# Patient Record
Sex: FEMALE | Race: WHITE | Employment: FULL TIME | URBAN - METROPOLITAN AREA
[De-identification: names, ages, dates, MRNs, and addresses within clinical notes are randomized per-mention and may not be internally consistent; named-entity substitution may affect disease eponyms.]

---

## 2017-07-10 ENCOUNTER — HOSPITAL ENCOUNTER (EMERGENCY)
Facility: CLINIC | Age: 37
Discharge: HOME | End: 2017-07-11
Attending: EMERGENCY MEDICINE | Admitting: EMERGENCY MEDICINE
Payer: COMMERCIAL

## 2017-07-10 ENCOUNTER — APPOINTMENT (OUTPATIENT)
Dept: CT IMAGING | Facility: CLINIC | Age: 37
End: 2017-07-10
Attending: EMERGENCY MEDICINE
Payer: COMMERCIAL

## 2017-07-10 ENCOUNTER — OFFICE VISIT (OUTPATIENT)
Dept: URGENT CARE | Facility: URGENT CARE | Age: 37
End: 2017-07-10
Payer: COMMERCIAL

## 2017-07-10 VITALS
WEIGHT: 154 LBS | TEMPERATURE: 97.9 F | BODY MASS INDEX: 26.03 KG/M2 | DIASTOLIC BLOOD PRESSURE: 80 MMHG | SYSTOLIC BLOOD PRESSURE: 120 MMHG | OXYGEN SATURATION: 98 % | RESPIRATION RATE: 16 BRPM
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
## 2017-07-10 DIAGNOSIS — G44.311 INTRACTABLE ACUTE POST-TRAUMATIC HEADACHE: Primary | ICD-10-CM

## 2017-07-10 DIAGNOSIS — G43.001 MIGRAINE WITHOUT AURA AND WITH STATUS MIGRAINOSUS, NOT INTRACTABLE: ICD-10-CM

## 2017-07-10 PROCEDURE — 25000128 H RX IP 250 OP 636: Performed by: EMERGENCY MEDICINE

## 2017-07-10 PROCEDURE — 96374 THER/PROPH/DIAG INJ IV PUSH: CPT

## 2017-07-10 PROCEDURE — 70450 CT HEAD/BRAIN W/O DYE: CPT

## 2017-07-10 PROCEDURE — 96361 HYDRATE IV INFUSION ADD-ON: CPT

## 2017-07-10 PROCEDURE — 99207 ZZC NO BILLABLE SERVICE THIS VISIT: CPT | Performed by: HOSPITALIST

## 2017-07-10 PROCEDURE — 96375 TX/PRO/DX INJ NEW DRUG ADDON: CPT

## 2017-07-10 PROCEDURE — 99285 EMERGENCY DEPT VISIT HI MDM: CPT | Mod: 25

## 2017-07-10 RX ORDER — SODIUM CHLORIDE 9 MG/ML
1000 INJECTION, SOLUTION INTRAVENOUS CONTINUOUS
Status: DISCONTINUED | OUTPATIENT
Start: 2017-07-10 | End: 2017-07-11 | Stop reason: HOSPADM

## 2017-07-10 RX ORDER — DIPHENHYDRAMINE HYDROCHLORIDE 50 MG/ML
25 INJECTION INTRAMUSCULAR; INTRAVENOUS ONCE
Status: COMPLETED | OUTPATIENT
Start: 2017-07-10 | End: 2017-07-10

## 2017-07-10 RX ORDER — METOCLOPRAMIDE HYDROCHLORIDE 5 MG/ML
10 INJECTION INTRAMUSCULAR; INTRAVENOUS ONCE
Status: COMPLETED | OUTPATIENT
Start: 2017-07-10 | End: 2017-07-10

## 2017-07-10 RX ORDER — DEXAMETHASONE SODIUM PHOSPHATE 10 MG/ML
10 INJECTION, SOLUTION INTRAMUSCULAR; INTRAVENOUS ONCE
Status: COMPLETED | OUTPATIENT
Start: 2017-07-10 | End: 2017-07-10

## 2017-07-10 RX ADMIN — SODIUM CHLORIDE 1000 ML: 9 INJECTION, SOLUTION INTRAVENOUS at 22:55

## 2017-07-10 RX ADMIN — PROCHLORPERAZINE EDISYLATE 10 MG: 5 INJECTION INTRAMUSCULAR; INTRAVENOUS at 22:57

## 2017-07-10 RX ADMIN — METOCLOPRAMIDE 10 MG: 5 INJECTION, SOLUTION INTRAMUSCULAR; INTRAVENOUS at 23:17

## 2017-07-10 RX ADMIN — DEXAMETHASONE SODIUM PHOSPHATE 10 MG: 10 INJECTION, SOLUTION INTRAMUSCULAR; INTRAVENOUS at 22:55

## 2017-07-10 RX ADMIN — DIPHENHYDRAMINE HYDROCHLORIDE 25 MG: 50 INJECTION, SOLUTION INTRAMUSCULAR; INTRAVENOUS at 22:55

## 2017-07-10 NOTE — ED AVS SNAPSHOT
[facility]    [address]    [facility] [address]    Phone:  [phone]    Fax:  [phone]                                       Bhavna Rich   MRN: [id number]    Department:  [facility]   Date of Visit:  7/10/2017           After Visit Summary Signature Page     I have received my discharge instructions, and my questions have been answered. I have discussed any challenges I see with this plan with the nurse or doctor.    ..........................................................................................................................................  Patient/Patient Representative Signature      ..........................................................................................................................................  Patient Representative Print Name and Relationship to Patient    ..................................................               ................................................  Date                                            Time    ..........................................................................................................................................  Reviewed by Signature/Title    ...................................................              ..............................................  Date                                                            Time
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
## 2017-07-10 NOTE — MR AVS SNAPSHOT
After Visit Summary   7/10/2017    Bhavna Rich    MRN: [id number]           Patient Information     Date Of Birth          1980        Visit Information        Provider Department      7/10/2017 8:55 PM Herman Covarrubias MD [facility]        Today's Diagnoses     Intractable acute post-traumatic headache    -  1       Follow-ups after your visit        Who to contact     If you have questions or need follow up information about today's clinic visit or your schedule please contact [facility] directly at [phone].  Normal or non-critical lab and imaging results will be communicated to you by [employer], letter or phone within 4 business days after the clinic has received the results. If you do not hear from us within 7 days, please contact the clinic through [employer] or phone. If you have a critical or abnormal lab result, we will notify you by phone as soon as possible.  Submit refill requests through [employer] or call your pharmacy and they will forward the refill request to us. Please allow 3 business days for your refill to be completed.          Additional Information About Your Visit        MyChart Information     [employer] gives you secure access to your electronic health record. If you see a primary care provider, you can also send messages to your care team and make appointments. If you have questions, please call your primary care clinic.  If you do not have a primary care provider, please call [phone] and they will assist you.        Care EveryWhere ID     This is your Care EveryWhere ID. This could be used by other organizations to access your [facility] medical records  [id number]        Your Vitals Were     Temperature Respirations Last Period Pulse Oximetry BMI (Body Mass Index)       97.9  F (36.6  C) (Oral) 16 08/01/2012 98% 26.03 kg/m2        Blood Pressure from Last 3 Encounters:   07/10/17 120/80   09/30/16 114/65   05/18/15  120/70    Weight from Last 3 Encounters:   07/10/17 154 lb (69.9 kg)   05/18/15 174 lb (78.9 kg)   03/22/13 151 lb 9 oz (68.7 kg)              Today, you had the following     No orders found for display       Primary Care Provider Office Phone # Fax #    [facility] [phone]       [address] [facility] [address]        Equal Access to Services     CHANO MACKEY : Hadii aad ku hadasho Soomaali, waaxda luqadaha, qaybta kaalmada adeegyada, waxay idiin hayaan adeeg cornell díaz . So [facility] [phone].    ATENCIÓN: Si habla español, tiene a mccartney disposición servicios gratuitos de asistencia lingüística. Llame al [phone].    We comply with applicable federal civil rights laws and [place] laws. We do not discriminate on the basis of race, color, national origin, age, disability sex, sexual orientation or gender identity.            Thank you!     Thank you for choosing [facility]  for your care. Our goal is always to provide you with excellent care. Hearing back from our patients is one way we can continue to improve our services. Please take a few minutes to complete the written survey that you may receive in the mail after your visit with us. Thank you!             Your Updated Medication List - Protect others around you: Learn how to safely use, store and throw away your medicines at www.disposemymeds.org.          This list is accurate as of: 7/10/17  9:05 PM.  Always use your most recent med list.                   Brand Name Dispense Instructions for use Diagnosis    cetirizine 10 MG tablet    zyrTEC     Take 10 mg by mouth daily        citalopram 40 MG tablet    celeXA    90 tablet    Take 1 tablet by mouth daily.    Anxiety state, unspecified, Major depressive disorder       NONFORMULARY      1 tablet daily Brand name = [employer] Works        TRAZODONE HCL PO      Take 100 mg by mouth At Bedtime        varenicline 0.5 MG X 11 & 1 MG X 42 tablet    CHANTIX IVANNA      Take one 0.5mg tab daily for 3 days, then one 0.5mg tab twice daily for 4 days, then one 1mg tab twice daily.        VENTOLIN  (90 BASE) MCG/ACT Inhaler   Generic drug:  albuterol      1-2 puffs every 6 hours as needed

## 2017-07-11 VITALS
SYSTOLIC BLOOD PRESSURE: 115 MMHG | DIASTOLIC BLOOD PRESSURE: 73 MMHG | RESPIRATION RATE: 18 BRPM | BODY MASS INDEX: 26.29 KG/M2 | OXYGEN SATURATION: 95 % | HEART RATE: 75 BPM | TEMPERATURE: 98.1 F | WEIGHT: 154 LBS | HEIGHT: 64 IN

## 2017-07-11 PROCEDURE — 96361 HYDRATE IV INFUSION ADD-ON: CPT

## 2017-07-11 ASSESSMENT — ENCOUNTER SYMPTOMS
HEADACHES: 1
NECK STIFFNESS: 1
BACK PAIN: 0
NECK PAIN: 0
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
## 2018-06-16 ENCOUNTER — HEALTH MAINTENANCE LETTER (OUTPATIENT)
Age: 38
End: 2018-06-16

## 2019-10-03 ENCOUNTER — HEALTH MAINTENANCE LETTER (OUTPATIENT)
Age: 39
End: 2019-10-03

## 2020-03-22 ENCOUNTER — HEALTH MAINTENANCE LETTER (OUTPATIENT)
Age: 40
End: 2020-03-22

## 2021-01-15 ENCOUNTER — HEALTH MAINTENANCE LETTER (OUTPATIENT)
Age: 41
End: 2021-01-15

## 2021-04-03 ENCOUNTER — HOSPITAL ENCOUNTER (EMERGENCY)
Facility: CLINIC | Age: 41
Discharge: HOME | End: 2021-04-03
Attending: EMERGENCY MEDICINE | Admitting: EMERGENCY MEDICINE
Payer: COMMERCIAL

## 2021-04-03 VITALS
TEMPERATURE: 97 F | DIASTOLIC BLOOD PRESSURE: 90 MMHG | RESPIRATION RATE: 18 BRPM | OXYGEN SATURATION: 95 % | SYSTOLIC BLOOD PRESSURE: 131 MMHG | HEART RATE: 96 BPM

## 2021-04-03 DIAGNOSIS — R51.9 NONINTRACTABLE HEADACHE, UNSPECIFIED CHRONICITY PATTERN, UNSPECIFIED HEADACHE TYPE: ICD-10-CM

## 2021-04-03 PROCEDURE — 99284 EMERGENCY DEPT VISIT MOD MDM: CPT | Mod: 25

## 2021-04-03 PROCEDURE — 250N000011 HC RX IP 250 OP 636: Performed by: EMERGENCY MEDICINE

## 2021-04-03 PROCEDURE — 96374 THER/PROPH/DIAG INJ IV PUSH: CPT

## 2021-04-03 PROCEDURE — 96361 HYDRATE IV INFUSION ADD-ON: CPT

## 2021-04-03 PROCEDURE — 258N000003 HC RX IP 258 OP 636: Performed by: EMERGENCY MEDICINE

## 2021-04-03 PROCEDURE — 96375 TX/PRO/DX INJ NEW DRUG ADDON: CPT

## 2021-04-03 RX ORDER — KETOROLAC TROMETHAMINE 15 MG/ML
15 INJECTION, SOLUTION INTRAMUSCULAR; INTRAVENOUS ONCE
Status: COMPLETED | OUTPATIENT
Start: 2021-04-03 | End: 2021-04-03

## 2021-04-03 RX ORDER — DIPHENHYDRAMINE HYDROCHLORIDE 50 MG/ML
25 INJECTION INTRAMUSCULAR; INTRAVENOUS ONCE
Status: COMPLETED | OUTPATIENT
Start: 2021-04-03 | End: 2021-04-03

## 2021-04-03 RX ORDER — SODIUM CHLORIDE 9 MG/ML
INJECTION, SOLUTION INTRAVENOUS CONTINUOUS
Status: DISCONTINUED | OUTPATIENT
Start: 2021-04-03 | End: 2021-04-04 | Stop reason: HOSPADM

## 2021-04-03 RX ORDER — METOCLOPRAMIDE HYDROCHLORIDE 5 MG/ML
10 INJECTION INTRAMUSCULAR; INTRAVENOUS ONCE
Status: COMPLETED | OUTPATIENT
Start: 2021-04-03 | End: 2021-04-03

## 2021-04-03 RX ORDER — DEXAMETHASONE SODIUM PHOSPHATE 4 MG/ML
10 INJECTION, SOLUTION INTRA-ARTICULAR; INTRALESIONAL; INTRAMUSCULAR; INTRAVENOUS; SOFT TISSUE ONCE
Status: COMPLETED | OUTPATIENT
Start: 2021-04-03 | End: 2021-04-03

## 2021-04-03 RX ADMIN — KETOROLAC TROMETHAMINE 15 MG: 15 INJECTION, SOLUTION INTRAMUSCULAR; INTRAVENOUS at 21:06

## 2021-04-03 RX ADMIN — SODIUM CHLORIDE 1000 ML: 9 INJECTION, SOLUTION INTRAVENOUS at 21:08

## 2021-04-03 RX ADMIN — DEXAMETHASONE SODIUM PHOSPHATE 10 MG: 4 INJECTION, SOLUTION INTRA-ARTICULAR; INTRALESIONAL; INTRAMUSCULAR; INTRAVENOUS; SOFT TISSUE at 21:03

## 2021-04-03 RX ADMIN — METOCLOPRAMIDE HYDROCHLORIDE 10 MG: 5 INJECTION INTRAMUSCULAR; INTRAVENOUS at 21:02

## 2021-04-03 RX ADMIN — DIPHENHYDRAMINE HYDROCHLORIDE 25 MG: 50 INJECTION INTRAMUSCULAR; INTRAVENOUS at 21:07

## 2021-04-03 ASSESSMENT — ENCOUNTER SYMPTOMS
VOMITING: 0
HEADACHES: 1
FEVER: 0
COUGH: 0

## 2021-04-04 NOTE — ED PROVIDER NOTES
History   Chief Complaint:  Headache       HPI   Bhavna Rcih is a 40 year old female with history of migraines who presents with headache. The patient states that at 1430 she developed a headache. The patient states that it started in the back of her head and has now radiated to her whole head. She denies any fever, cough, vomiting or vision changes or numbness/weakness in the extremities.  Headache has progressed since onset and was not maximal at onset.  The patient states that she took Advil, Compazine and Sumatriptan x2 without much relief. The patient states that she has a history migraines and is currently being followed by a neurologist who has performed a couple of blocks.       MR brain 7/18/2017:  1.  Normal brain MRI.  2.  No acute intracranial finding. No evidence for recent infarct, hemorrhage or mass.    Review of Systems   Constitutional: Negative for fever.   Eyes: Negative for visual disturbance.   Respiratory: Negative for cough.    Gastrointestinal: Negative for vomiting.   Neurological: Positive for headaches.   All other systems reviewed and are negative.      Allergies:  Ancef   Vancomycin  Ciprofloxacin  Daptomycin  Erythromycin  Penicillins  Zoloft    Medications:  Celexa   Trazodone   Ventolin   Chantix   Ativan   Buspar   Atarax   Remeron   Flexeril   Maxalt   Medrol dosepak   Compazine   Sumatriptan     Past Medical History:    Migraines   Depression  generalized anxiety disorder  GERD   sensory neuropathy      Past Surgical History:    partial hysterectomy   Foot surgery   Femur surgery   Appendectomy open   C section   Carpal tunnel release   Tubal ligation     Family History:    Lung cancer- mother   Depression- sister     Social History:  Current everyday smoker     Physical Exam     Patient Vitals for the past 24 hrs:   BP Temp Pulse Resp SpO2   04/03/21 2200 (!) 131/90 -- 96 -- 95 %   04/03/21 2145 131/85 -- 98 -- 95 %   04/03/21 2100 121/88 -- 111 -- 96 %   04/03/21 2009  (!) 154/98 -- -- -- --   04/03/21 2008 -- 97  F (36.1  C) 125 18 97 %       Physical Exam  VS: Reviewed per above  HENT: Mucous membranes moist, no nuchal rigidity  EYES: sclera anicteric  CV: Rate as noted, regular rhythm.   RESP: Effort normal. Breath sounds are normal bilaterally.  GI: no tenderness/rebound/guarding, not distended.  NEURO: GCS 15, cranial nerves II through XII are intact, 5 out of 5 strength in all 4 extremities, sensation is intact light touch in all 4 extremities  MSK: No deformity of the extremities  SKIN: Warm and dry      Emergency Department Course     Procedures    Emergency Department Course:    Reviewed:  I reviewed nursing notes, vitals, past medical history and care everywhere    Assessments:  2055 I obtained history and examined the patient as noted above.     2210 I rechecked the patient and explained findings prior to discharge.     Interventions:  2102 Reglan 10 mg IV     2103 Decadron 10 mg IV     2106 Toradol 15 mg IV     2107 Benadryl 25 mg IV    2108 NS, 1 L, IV    Disposition:  The patient was discharged to home.     Impression & Plan     CMS Diagnoses: None    Medical Decision Making:  Bhavna Rich is a 40 year old female who presents with a headache in setting of history of migraines.  Evaluation in the emergency department has been negative. The patient has not had any fever, weakness, numbness, paresthesias, neck stiffness, seizures, vision changes or confusion. The headache was not worst at onset, worst headache of life, nor thunderclap in nature. Meningitis, pseudotumor, subarachnoid hemorrhage, CNS tumor, venous thrombosis and stroke are considered as part of the differential, and considered unlikely. There has been no trauma to suggest intracranial hemorrhage. I do not believe imaging is indicated at this time.  Her pain has improved with medication interventions.  I encouraged ongoing neurology follow-up.  I recommended she return for worse pain, fever,  vomiting, weakness, or any other concerns.        Covid-19  Bhavna Rich was evaluated during a global COVID-19 pandemic, which necessitated consideration that the patient might be at risk for infection with the SARS-CoV-2 virus that causes COVID-19.   Applicable protocols for evaluation were followed during the patient's care.     Diagnosis:    ICD-10-CM    1. Nonintractable headache, unspecified chronicity pattern, unspecified headache type  R51.9      Scribe Disclosure:  I, Jazmyne Nolasco, am serving as a scribe at 8:37 PM on 4/3/2021 to document services personally performed by Arnoldo Khan MD based on my observations and the provider's statements to me.              Arnoldo Khan MD  04/04/21 0027

## 2021-04-04 NOTE — ED TRIAGE NOTES
Patient reports HA developed this AM. Hx migraines, took sumatriptan and compazine without relief   ABC intact

## 2021-04-05 ENCOUNTER — PRE VISIT (OUTPATIENT)
Dept: NEUROLOGY | Facility: CLINIC | Age: 41
End: 2021-04-05

## 2021-04-05 NOTE — TELEPHONE ENCOUNTER
FUTURE VISIT INFORMATION      FUTURE VISIT INFORMATION:    Date: 4/7/2021    Time: 1030am    [place] [facility] [place]:    Referring provider:  Self     Referring providers clinic:      Reason for visit/diagnosis  Migraines     RECORDS REQUESTED FROM:       Clinic name Comments Records Status Imaging Status   Internal ED Visit-4/3/2021    CT Head-7/10/2017 [facility] PACS         [facility]-3/29/2021    [facility]-2/19/2021    MR Brain-7/18/2017 [facility] Requested to [facility]                        4/6/2021-Spoke with [facility] to have images pushed ASA-MR @ 625am    4/7/2021-[facility] Images now in PACS-MR @ 723am

## 2021-04-07 ENCOUNTER — VIRTUAL VISIT (OUTPATIENT)
Dept: NEUROLOGY | Facility: CLINIC | Age: 41
End: 2021-04-07
Payer: COMMERCIAL

## 2021-04-07 ENCOUNTER — TELEPHONE (OUTPATIENT)
Dept: NEUROLOGY | Facility: CLINIC | Age: 41
End: 2021-04-07

## 2021-04-07 DIAGNOSIS — R51.9 WORSENING HEADACHES: ICD-10-CM

## 2021-04-07 DIAGNOSIS — G43.719 INTRACTABLE CHRONIC MIGRAINE WITHOUT AURA AND WITHOUT STATUS MIGRAINOSUS: Primary | ICD-10-CM

## 2021-04-07 PROCEDURE — 99204 OFFICE O/P NEW MOD 45 MIN: CPT | Mod: GT | Performed by: NURSE PRACTITIONER

## 2021-04-07 RX ORDER — PSEUDOEPHEDRINE HCL 30 MG
30 TABLET ORAL PRN
COMMUNITY
Start: 2020-03-06 | End: 2024-09-17

## 2021-04-07 RX ORDER — MIRTAZAPINE 45 MG/1
45 TABLET, FILM COATED ORAL AT BEDTIME
COMMUNITY
Start: 2021-03-31

## 2021-04-07 RX ORDER — LORAZEPAM 1 MG/1
.5-1 TABLET ORAL PRN
COMMUNITY
Start: 2021-03-04

## 2021-04-07 RX ORDER — PROCHLORPERAZINE MALEATE 10 MG
10 TABLET ORAL PRN
COMMUNITY
Start: 2021-02-19 | End: 2021-04-21

## 2021-04-07 RX ORDER — RIZATRIPTAN BENZOATE 10 MG/1
10 TABLET ORAL PRN
COMMUNITY
Start: 2021-02-19 | End: 2021-04-13

## 2021-04-07 RX ORDER — DULOXETIN HYDROCHLORIDE 60 MG/1
60 CAPSULE, DELAYED RELEASE ORAL DAILY
COMMUNITY
Start: 2021-03-22

## 2021-04-07 RX ORDER — FEXOFENADINE HCL 180 MG/1
180 TABLET ORAL PRN
COMMUNITY
Start: 2020-03-06 | End: 2024-01-03

## 2021-04-07 RX ORDER — DULOXETIN HYDROCHLORIDE 30 MG/1
30 CAPSULE, DELAYED RELEASE ORAL DAILY
COMMUNITY
Start: 2021-03-22

## 2021-04-07 RX ORDER — QUETIAPINE FUMARATE 50 MG/1
75 TABLET, FILM COATED ORAL 2 TIMES DAILY
COMMUNITY
Start: 2021-03-04 | End: 2024-01-03

## 2021-04-07 NOTE — TELEPHONE ENCOUNTER
Health Call Center    Phone Message    May a detailed message be left on voicemail: yes     Reason for Call: Other: Patient calling in regards to appointment with [facility] at 10:30 today 4/7 - patient states that she is unable to get into virtual waiting room due to browser not being supported.  Patient is requesting for link to be sent as a text to phone number [phone].    Please advise and call patient back at your earliest convenience to discuss further     Action Taken: [place] [facility]    Travel Screening: Not Applicable

## 2021-04-07 NOTE — PROGRESS NOTES
Bhavna is a 40 year old who is being evaluated via a billable video visit.      Unable to contact patient    How would you like to obtain your AVS? MyChart  If the video visit is dropped, the invitation should be resent by: Send to e-mail at: [email or web address]  Will anyone else be joining your video visit? No      Video Start Time: 10:59 AM  Video-Visit Details    Type of service:  Video Visit    Video End Time:    Originating Location (pt. Location): Home    Distant Location (provider location):  [facility]OLOG[facility]     Platform used for Video Visit: [facility]      Chief Complaint   Patient presents with     Consult     VIDEO VISIT SISSY Ndiaye    Video End Time: 11:41 AM    ASSESSMENT AND PLAN:   Headache symptoms appear to be migrainous phenotype. Because of worsening headaches it does not appear to be unreasonable to recommend brain MRI for any structural headache symptoms.   Plan discussed:  Brain MRI for any structural causes of worsening headaches  Stress relieving modalities   Sleep routine   Increased water intake  Antimigraine Nerivio devices -FDA cleared antimigraine device  Vitamin B2 (ribofalvin) 200-400 mg daily OTC side effects-GI, urine discoloration  Migraine headache prevention-Recommended a trial of migraine preventive treatment with Emgality. Side effects-allergic reaction or pain in the injection side or redness in the injection side. Unknown side effects with a long term use. Pregnancy is contraindicated.     Acute migraine headache treatment  Stop rizatriptan  sumatriptan -injection and sumatriptan  mg total per 24 hours if used the same day with sumatriptan   May cause serotine syndrome-symptoms reviewed  Compazine +Naproxen 500 mg every 12 hours as needed with food and can be taken with sumatriptan.     Follow up in 2-3 months or sooner if needed headache       Subjective   Bhavna is a 40 year old who presents for the following health issues  -headache   HPI   Headache history-Headaches with pregnancies. Onset of migraine headaches with starting a menstrual cycle. Migraine headaches have increased in the last couple of months. Patient went to the [facility].   Headaches  stem from the back of the head and usually right side and behind right eye. Pulsing and searing pain and starts to get nausea, light and noise sensitivity. Being in the dark and quiet room usually helps.  Triggers-noise, food, not enough sleep,   History of partial hysterectomy   Headache Frequency -10-15 days per month and duration all day      FH: father and paternal uncle -headaches    ED visit on 4/3/2021  Reglan, decadron, toradol, benadryl and IV fluids. Helped.     Headache treatment:  Occipital nerve blocks twice at [facility] but did not work mid-end March  Flexeril -did not help  Duloxetine 90 mg for depression for a couple of years and helps with depression  Gabapentin 100 mg TID per psychiatry and was increased 200 mg TID and caused side effects-stuttering and stopped gabapentin  lorazapam per psychiatry for panic attacks but it does not help  Medrol pack for headaches and did not help and no side effects  Mirtazapine and trazadone as needed for insomnia  Prochlorperazine 10 mg -tried once did help before but not last Saturday  Quetiapine 75 mg am and at bedtime  Rizatriptan tried twice and did not help  Sumatriptan -stopped working    SH:  Dental assistant, 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      Objective       Vitals:  No vitals were obtained today due to virtual visit.    Physical Exam   Headache today is mild but not as severe   GENERAL: Healthy, alert and no distress  EYES: Eyes grossly normal to inspection.  No discharge or erythema, or obvious scleral/conjunctival abnormalities.  RESP: No audible wheeze, cough, or visible cyanosis.   No visible retractions or increased work of breathing.    SKIN: Visible skin clear. No significant rash, abnormal pigmentation or lesions.  NEURO: Cranial nerves grossly intact.  Mentation and speech appropriate for age.  PSYCH: Mentation appears normal, affect normal/bright, judgement and insight intact, normal speech and appearance well-groomed.    DATA:  CT HEAD WITHOUT CONTRAST  7/10/2017 11:35 PM      HISTORY: Headache.     COMPARISON: None.     TECHNIQUE: Without intravenous contrast, helical sections were  acquired through the brain. Coronal reconstructions were generated.  Radiation dose for this scan was reduced using automated exposure  control, adjustment of the mA and/or kV according to the patient's  size, or iterative reconstruction technique.     FINDINGS: No intra-axial mass, mass effect or midline shift. Normal  gray-white matter differentiation. No visualized acute intra-axial  hemorrhage. The cerebral ventricles are normal in caliber. The basal  cisterns are patent. No extra-axial fluid collection. The visualized  portions of the paranasal sinuses and mastoid air cells are  unremarkable.                                                                      IMPRESSION: No evidence of acute intracranial abnormality.     LES DOW MD      I discussed all my recommendations with Bhavna Rich who verbalizes understanding and comfortable with the plan.  All of patient's questions were answered from the best of my knowledge.  Patient is in agreement with the plan.     45 minutes spent on the date of the encounter doing chart review, history and exam, documentation and further activities as noted above    RAVI Mckeon, CNP [facility]  Headache certified  [facility]

## 2021-04-07 NOTE — TELEPHONE ENCOUNTER
Patient phone number was given to the roomers, stated that link will be sent to patient phone number that has been provided by patient

## 2021-04-07 NOTE — LETTER
4/7/2021       RE: Bhavna Rich  [address] [address]     Dear Colleague,    Thank you for referring your patient, Bhavna Rich, to the [facility] [place] at [facility]. Please see a copy of my visit note below.    Bhavna is a 40 year old who is being evaluated via a billable video visit.      Unable to contact patient    How would you like to obtain your AVS? MyChart  If the video visit is dropped, the invitation should be resent by: Send to e-mail at: [email or web address]  Will anyone else be joining your video visit? No      Video Start Time: 10:59 AM  Video-Visit Details    Type of service:  Video Visit    Video End Time:    Originating Location (pt. Location): Home    Distant Location (provider location):  [facility] [place]     Platform used for Video Visit: [facility]      Chief Complaint   Patient presents with     Consult     VIDEO VISIT SISSY Ndiaye    Video End Time: 11:41 AM    ASSESSMENT AND PLAN:   Headache symptoms appear to be migrainous phenotype. Because of worsening headaches it does not appear to be unreasonable to recommend brain MRI for any structural headache symptoms.   Plan discussed:  Brain MRI for any structural causes of worsening headaches  Stress relieving modalities   Sleep routine   Increased water intake  Antimigraine Nerivio devices -FDA cleared antimigraine device  Vitamin B2 (ribofalvin) 200-400 mg daily OTC side effects-GI, urine discoloration  Migraine headache prevention-Recommended a trial of migraine preventive treatment with Emgality. Side effects-allergic reaction or pain in the injection side or redness in the injection side. Unknown side effects with a long term use. Pregnancy is contraindicated.     Acute migraine headache treatment  Stop rizatriptan  sumatriptan -injection and sumatriptan  mg total per 24 hours if used the  same day with sumatriptan   May cause serotine syndrome-symptoms reviewed  Compazine +Naproxen 500 mg every 12 hours as needed with food and can be taken with sumatriptan.     Follow up in 2-3 months or sooner if needed headache       Subjective   Bhavna is a 40 year old who presents for the following health issues -headache   HPI   Headache history-Headaches with pregnancies. Onset of migraine headaches with starting a menstrual cycle. Migraine headaches have increased in the last couple of months. Patient went to the [facility].   Headaches  stem from the back of the head and usually right side and behind right eye. Pulsing and searing pain and starts to get nausea, light and noise sensitivity. Being in the dark and quiet room usually helps.  Triggers-noise, food, not enough sleep,   History of partial hysterectomy   Headache Frequency -10-15 days per month and duration all day      FH: father and paternal uncle -headaches    ED visit on 4/3/2021  Reglan, decadron, toradol, benadryl and IV fluids. Helped.     Headache treatment:  Occipital nerve blocks twice at [facility] but did not work mid-end March  Flexeril -did not help  Duloxetine 90 mg for depression for a couple of years and helps with depression  Gabapentin 100 mg TID per psychiatry and was increased 200 mg TID and caused side effects-stuttering and stopped gabapentin  lorazapam per psychiatry for panic attacks but it does not help  Medrol pack for headaches and did not help and no side effects  Mirtazapine and trazadone as needed for insomnia  Prochlorperazine 10 mg -tried once did help before but not last Saturday  Quetiapine 75 mg am and at bedtime  Rizatriptan tried twice and did not help  Sumatriptan -stopped working    SH:  Dental assistant, 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      Objective       Vitals:  No  vitals were obtained today due to virtual visit.    Physical Exam   Headache today is mild but not as severe   GENERAL: Healthy, alert and no distress  EYES: Eyes grossly normal to inspection.  No discharge or erythema, or obvious scleral/conjunctival abnormalities.  RESP: No audible wheeze, cough, or visible cyanosis.  No visible retractions or increased work of breathing.    SKIN: Visible skin clear. No significant rash, abnormal pigmentation or lesions.  NEURO: Cranial nerves grossly intact.  Mentation and speech appropriate for age.  PSYCH: Mentation appears normal, affect normal/bright, judgement and insight intact, normal speech and appearance well-groomed.    DATA:  CT HEAD WITHOUT CONTRAST  7/10/2017 11:35 PM      HISTORY: Headache.     COMPARISON: None.     TECHNIQUE: Without intravenous contrast, helical sections were  acquired through the brain. Coronal reconstructions were generated.  Radiation dose for this scan was reduced using automated exposure  control, adjustment of the mA and/or kV according to the patient's  size, or iterative reconstruction technique.     FINDINGS: No intra-axial mass, mass effect or midline shift. Normal  gray-white matter differentiation. No visualized acute intra-axial  hemorrhage. The cerebral ventricles are normal in caliber. The basal  cisterns are patent. No extra-axial fluid collection. The visualized  portions of the paranasal sinuses and mastoid air cells are  unremarkable.                                                                      IMPRESSION: No evidence of acute intracranial abnormality.     LES DOW MD      I discussed all my recommendations with Bhavna Rich who verbalizes understanding and comfortable with the plan.  All of patient's questions were answered from the best of my knowledge.  Patient is in agreement with the plan.     45 minutes spent on the date of the encounter doing chart review, history and exam, documentation and further  activities as noted above    RAVI Mckeon, CNP [facility]  Headache certified  [facility]

## 2021-04-08 ENCOUNTER — TELEPHONE (OUTPATIENT)
Dept: NEUROLOGY | Facility: CLINIC | Age: 41
End: 2021-04-08

## 2021-04-08 DIAGNOSIS — F41.9 ANXIETY: Primary | ICD-10-CM

## 2021-04-08 RX ORDER — DIAZEPAM 5 MG
TABLET ORAL
Qty: 1 TABLET | Refills: 0 | Status: SHIPPED | OUTPATIENT
Start: 2021-04-08 | End: 2021-04-21

## 2021-04-08 NOTE — TELEPHONE ENCOUNTER
PA Initiation    Medication: galcanezumab-gnlm (EMGALITY) 120 MG/ML injection; Inject 240 mg subcutaneous first month and then inject 120 mg subcutaneous every 28 days  Insurance Company: [employer] - Phone [phone] Fax [phone]  Pharmacy Filling the Rx: [id number] - [place], [place] - [address]  Filling Pharmacy Phone: [phone]  Filling Pharmacy Fax:    Start Date: 4/8/2021    Central Prior Authorization Team   Phone: [phone]

## 2021-04-09 NOTE — TELEPHONE ENCOUNTER
Prior Authorization Approval    Authorization Effective Date: 3/9/2021  Authorization Expiration Date: 10/9/2021  Medication: galcanezumab-gnlm (EMGALITY) 120 MG/ML injection; Inject 240 mg subcutaneous first month and then inject 120 mg subcutaneous every 28 days  Approved Dose/Quantity: 120mg/ml  Reference #: [id number]   Insurance Company: [employer] - Phone [phone] Fax [phone]  Which Pharmacy is filling the prescription (Not needed for infusion/clinic administered): [id number] - [place], [place] - [address]  [address]   Pharmacy Notified: Yes **Instructed pharmacy to notify patient when script is ready to /ship.**  Patient Notified: Yes

## 2021-04-12 ENCOUNTER — MYC MEDICAL ADVICE (OUTPATIENT)
Dept: NEUROLOGY | Facility: CLINIC | Age: 41
End: 2021-04-12

## 2021-04-12 DIAGNOSIS — G43.719 INTRACTABLE CHRONIC MIGRAINE WITHOUT AURA AND WITHOUT STATUS MIGRAINOSUS: ICD-10-CM

## 2021-04-12 NOTE — TELEPHONE ENCOUNTER
Rx Authorization:    Requested Medication/ DoseSUMAtriptan (IMITREX STATDOSE) 6 MG/0.5ML refill cartridge    Date last refill ordered: 4/8/21    Quantity ordered: 3 ml    # refills: 6    Date of last clinic visit with ordering provider: 4/7/21    Date of next clinic visit with ordering provider:     All pertinent protocol data (lab date/result):     Include pertinent information from patients message:

## 2021-04-13 DIAGNOSIS — G43.719 INTRACTABLE CHRONIC MIGRAINE WITHOUT AURA AND WITHOUT STATUS MIGRAINOSUS: ICD-10-CM

## 2021-04-13 DIAGNOSIS — G43.719 INTRACTABLE CHRONIC MIGRAINE WITHOUT AURA AND WITHOUT STATUS MIGRAINOSUS: Primary | ICD-10-CM

## 2021-04-13 RX ORDER — SUMATRIPTAN 100 MG/1
100 TABLET, FILM COATED ORAL
Qty: 12 TABLET | Refills: 9 | Status: SHIPPED | OUTPATIENT
Start: 2021-04-13 | End: 2022-03-30

## 2021-04-14 ENCOUNTER — HOSPITAL ENCOUNTER (OUTPATIENT)
Dept: MRI IMAGING | Facility: CLINIC | Age: 41
Discharge: HOME | End: 2021-04-14
Attending: NURSE PRACTITIONER | Admitting: NURSE PRACTITIONER
Payer: COMMERCIAL

## 2021-04-14 DIAGNOSIS — G43.719 INTRACTABLE CHRONIC MIGRAINE WITHOUT AURA AND WITHOUT STATUS MIGRAINOSUS: ICD-10-CM

## 2021-04-14 PROCEDURE — A9585 GADOBUTROL INJECTION: HCPCS | Performed by: NURSE PRACTITIONER

## 2021-04-14 PROCEDURE — 70553 MRI BRAIN STEM W/O & W/DYE: CPT | Mod: 26 | Performed by: RADIOLOGY

## 2021-04-14 PROCEDURE — 255N000002 HC RX 255 OP 636: Performed by: NURSE PRACTITIONER

## 2021-04-14 PROCEDURE — 70553 MRI BRAIN STEM W/O & W/DYE: CPT

## 2021-04-14 RX ORDER — GADOBUTROL 604.72 MG/ML
7.5 INJECTION INTRAVENOUS ONCE
Status: COMPLETED | OUTPATIENT
Start: 2021-04-14 | End: 2021-04-14

## 2021-04-14 RX ADMIN — GADOBUTROL 6 ML: 604.72 INJECTION INTRAVENOUS at 11:16

## 2021-04-20 NOTE — RESULT ENCOUNTER NOTE
Gerardo Huggins,  Your brain MRI results and images reviewed and no acute findings at this time. Follow up as discussed.   Take care,  So

## 2021-04-21 ENCOUNTER — VIRTUAL VISIT (OUTPATIENT)
Dept: NEUROLOGY | Facility: CLINIC | Age: 41
End: 2021-04-21
Payer: COMMERCIAL

## 2021-04-21 DIAGNOSIS — G43.719 INTRACTABLE CHRONIC MIGRAINE WITHOUT AURA AND WITHOUT STATUS MIGRAINOSUS: Primary | ICD-10-CM

## 2021-04-21 PROCEDURE — 99214 OFFICE O/P EST MOD 30 MIN: CPT | Mod: GT | Performed by: NURSE PRACTITIONER

## 2021-04-21 RX ORDER — CYCLOBENZAPRINE HCL 10 MG
TABLET ORAL
COMMUNITY
Start: 2021-04-18 | End: 2021-04-21

## 2021-04-21 RX ORDER — PROPRANOLOL HYDROCHLORIDE 10 MG/1
10 TABLET ORAL 2 TIMES DAILY PRN
Qty: 60 TABLET | Refills: 1 | Status: SHIPPED | OUTPATIENT
Start: 2021-04-21 | End: 2021-05-14

## 2021-04-21 RX ORDER — PROCHLORPERAZINE MALEATE 10 MG
10 TABLET ORAL EVERY 6 HOURS PRN
Qty: 20 TABLET | Refills: 5 | Status: SHIPPED | OUTPATIENT
Start: 2021-04-21 | End: 2021-07-28
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
## 2021-04-28 DIAGNOSIS — G43.719 INTRACTABLE CHRONIC MIGRAINE WITHOUT AURA AND WITHOUT STATUS MIGRAINOSUS: ICD-10-CM

## 2021-04-28 NOTE — TELEPHONE ENCOUNTER
Rx Authorization:    Requested Medication/ Dose:Emgality Pen 120MG/Ml    Date last refill ordered: 4/13/21    Quantity ordered: 2ML    # refills: 11    Date of last clinic visit with ordering provider: 4/21/21    Date of next clinic visit with ordering provider: F/U 1 year    All pertinent protocol data (lab date/result):     Include pertinent information from patients message:

## 2021-04-29 DIAGNOSIS — M62.838 MUSCLE SPASM: ICD-10-CM

## 2021-04-29 DIAGNOSIS — G43.719 INTRACTABLE CHRONIC MIGRAINE WITHOUT AURA AND WITHOUT STATUS MIGRAINOSUS: Primary | ICD-10-CM

## 2021-04-29 RX ORDER — CYCLOBENZAPRINE HCL 5 MG
5-10 TABLET ORAL
Qty: 30 TABLET | Refills: 1 | Status: SHIPPED | OUTPATIENT
Start: 2021-04-29 | End: 2021-05-06

## 2021-05-03 NOTE — TELEPHONE ENCOUNTER
Called and spoke with patient and patient stated that she does have enough refill for her Emgality Pen

## 2021-05-04 RX ORDER — GALCANEZUMAB 120 MG/ML
120 INJECTION, SOLUTION SUBCUTANEOUS
Qty: 1 ML | Refills: 11 | Status: SHIPPED | OUTPATIENT
Start: 2021-05-04 | End: 2022-01-26

## 2021-05-06 ENCOUNTER — TELEPHONE (OUTPATIENT)
Dept: NEUROLOGY | Facility: CLINIC | Age: 41
End: 2021-05-06

## 2021-05-09 DIAGNOSIS — G43.719 INTRACTABLE CHRONIC MIGRAINE WITHOUT AURA AND WITHOUT STATUS MIGRAINOSUS: Primary | ICD-10-CM

## 2021-05-09 DIAGNOSIS — M62.838 MUSCLE SPASM: ICD-10-CM

## 2021-05-09 RX ORDER — TIZANIDINE 2 MG/1
2-4 TABLET ORAL AT BEDTIME
Qty: 60 TABLET | Refills: 3 | Status: SHIPPED | OUTPATIENT
Start: 2021-05-09 | End: 2021-07-28

## 2021-05-11 ENCOUNTER — TELEPHONE (OUTPATIENT)
Dept: NEUROLOGY | Facility: CLINIC | Age: 41
End: 2021-05-11

## 2021-05-11 DIAGNOSIS — G43.719 INTRACTABLE CHRONIC MIGRAINE WITHOUT AURA AND WITHOUT STATUS MIGRAINOSUS: ICD-10-CM

## 2021-05-11 NOTE — TELEPHONE ENCOUNTER
Prior Authorization Retail Medication Request    Medication/Dose: emgality 120 mg every 28 days  ICD code (if different than what is on RX):   Occipital nerve blocks twice at [facility] but did not work mid-end March  Flexeril -did not help  Duloxetine 90 mg for depression for a couple of years and helps with depression  Gabapentin 100 mg TID per psychiatry and was increased 200 mg TID and caused side effects-stuttering and stopped gabapentin  lorazapam per psychiatry for panic attacks but it does not help  Medrol pack for headaches and did not help and no side effects  Mirtazapine and trazadone as needed for insomnia  Prochlorperazine 10 mg -tried once did help before but not last Saturday  Quetiapine 75 mg am and at bedtime  Rizatriptan tried twice and did not help  Sumatriptan -stopped working  Rationale:  Chronic migraine     Insurance Name:  [facility]  Insurance ID:  [id number]      Pharmacy Information (if different than what is on RX)  Name:    Phone:

## 2021-05-12 RX ORDER — GALCANEZUMAB 120 MG/ML
INJECTION, SOLUTION SUBCUTANEOUS
OUTPATIENT
Start: 2021-05-12

## 2021-05-13 DIAGNOSIS — G43.719 INTRACTABLE CHRONIC MIGRAINE WITHOUT AURA AND WITHOUT STATUS MIGRAINOSUS: ICD-10-CM

## 2021-05-13 NOTE — TELEPHONE ENCOUNTER
Rx Authorization:    Requested Medication/ Dose propranolol (INDERAL) 10 MG tablet    Date last refill ordered: 4/21/21    Quantity ordered: 60    # refills: 1    Date of last clinic visit with ordering provider: 4/21/21    Date of next clinic visit with ordering provider:     All pertinent protocol data (lab date/result):     Include pertinent information from patients message:   \

## 2021-05-14 RX ORDER — PROPRANOLOL HYDROCHLORIDE 10 MG/1
10 TABLET ORAL 2 TIMES DAILY PRN
Qty: 60 TABLET | Refills: 1 | Status: SHIPPED | OUTPATIENT
Start: 2021-05-14 | End: 2021-06-22

## 2021-06-18 DIAGNOSIS — G43.719 INTRACTABLE CHRONIC MIGRAINE WITHOUT AURA AND WITHOUT STATUS MIGRAINOSUS: ICD-10-CM

## 2021-06-18 NOTE — TELEPHONE ENCOUNTER
Rx Authorization:    Requested Medication/ Dosepropranolol (INDERAL) 10 MG tablet    Date last refill ordered: 5/14/21    Quantity ordered: 60 tablets    # refills: 1    Date of last clinic visit with ordering provider: 4/21/21    Date of next clinic visit with ordering provider:     All pertinent protocol data (lab date/result):     Include pertinent information from patients message:

## 2021-06-22 RX ORDER — PROPRANOLOL HYDROCHLORIDE 10 MG/1
10 TABLET ORAL 2 TIMES DAILY PRN
Qty: 60 TABLET | Refills: 5 | Status: SHIPPED | OUTPATIENT
Start: 2021-06-22 | End: 2021-07-28

## 2021-07-28 ENCOUNTER — VIRTUAL VISIT (OUTPATIENT)
Dept: NEUROLOGY | Facility: CLINIC | Age: 41
End: 2021-07-28
Payer: COMMERCIAL

## 2021-07-28 DIAGNOSIS — G43.719 INTRACTABLE CHRONIC MIGRAINE WITHOUT AURA AND WITHOUT STATUS MIGRAINOSUS: ICD-10-CM

## 2021-07-28 DIAGNOSIS — M62.838 MUSCLE SPASM: ICD-10-CM

## 2021-07-28 PROCEDURE — 99212 OFFICE O/P EST SF 10 MIN: CPT | Mod: GT | Performed by: NURSE PRACTITIONER

## 2021-07-28 RX ORDER — PROCHLORPERAZINE MALEATE 10 MG
10 TABLET ORAL EVERY 6 HOURS PRN
Qty: 20 TABLET | Refills: 5 | Status: SHIPPED | OUTPATIENT
Start: 2021-07-28 | End: 2023-01-11

## 2021-07-28 RX ORDER — TIZANIDINE 2 MG/1
2-4 TABLET ORAL AT BEDTIME
Qty: 60 TABLET | Refills: 9 | Status: SHIPPED | OUTPATIENT
Start: 2021-07-28 | End: 2022-01-26

## 2021-07-28 NOTE — PROGRESS NOTES
Bhavna is a 40 year old who is being evaluated via a billable video visit.      How would you like to obtain your AVS? MyChart  If the video visit is dropped, the invitation should be resent by: Text to cell phone: [phone]  Will anyone else be joining your video visit? No      Video Start Time: MIGRAINE DISABILITY ASSESSMENT (MIDAS)    On how many days in the last 3 months did you miss work or school because of your headaches?  0    How many days in the last 3 months was your productivity at work or school reduced by half or more because of your headaches? (Do not include days you counted in question 1 where you missed work or school.)  1    On how many days in the last 3 months did you not do household work (such as housework, home repairs and maintenance, shopping, caring for children and relatives) because of your headaches?  5    How many days in the last 3 months was your productivity in household work reduced by half or more because of your headaches? (Do not include days you counted in question 3 where you did not do household work).  2    On how many days in the last 3 months did you miss family, social, or lesiure activities because of your headaches?  0    MIDAS Total Score: 8    On how many days in the last 3 months did you have a headache? (If a headache lasted more than 1 day, count each day.)   5    On a scale of 0 - 10, on average how painful were these headaches (where 0 = no pain at all, and 10 = pain as bad as it can be.)  7  Video-Visit Details    Type of service:  Video Visit    Start video visit 3:04 PM    Video End Time:3:17 PM    Originating Location (pt. Location): Home    Distant Location (provider location):  [facility]UROLOGY [facility]IN[facility] [place]     Platform used for Video Visit: Prisca     Reason for visit -headache follow up   Interval History:  Initial Headache Clinic visit on 4/7/2021, see note for details.   Since last visit two migraine duration 1.5 hours  and 4  regular headaches.   First headache Sumatriptan oral+naproxen +compazine -headache improved  April 15 -sumatriptan inj +compazine +bendaryl and slept for 3 hours and woke better  Took Emgality yesterday -loading dose and feels good. No side effects    Emgality for 4 months and about 5 migraine headaches in 4 months. Patient reports that when she is do to get her next dose she gets a migraine headache.   Sumatriptan helps  Tizanidine helps as needed     Propranolol stopped.     Refills for tizanidine, sumatriptan oral sent to pharmacy.     Follow up in 6 months or sooner if needed     No side effects to Emgality, tizanidine, compazine, sumatriptan or naproxen reported today. No changes in treatment plan.     Patient appears alert and no in apparent acute distress,  mentation appears normal, judgement and insight intact, normal speech.      14 minutes spent on the date of the encounter doing video access, chart  review, meds review, treatment plan review, documentation and further activities as noted above    RAVI Mckeon, CNP [facility]  Headache certified  [facility]

## 2021-07-28 NOTE — LETTER
7/28/2021       RE: Bhavna Rich  [address] [address] [facility] [address] [facility] [address]     Dear Colleague,    Thank you for referring your patient, Bhavna iRch, to the [facility] [place] at [facility]. Please see a copy of my visit note below.    Video Start Time: MIGRAINE DISABILITY ASSESSMENT (MIDAS)    On how many days in the last 3 months did you miss work or school because of your headaches?  0    How many days in the last 3 months was your productivity at work or school reduced by half or more because of your headaches? (Do not include days you counted in question 1 where you missed work or school.)  1    On how many days in the last 3 months did you not do household work (such as housework, home repairs and maintenance, shopping, caring for children and relatives) because of your headaches?  5    How many days in the last 3 months was your productivity in household work reduced by half or more because of your headaches? (Do not include days you counted in question 3 where you did not do household work).  2    On how many days in the last 3 months did you miss family, social, or lesiure activities because of your headaches?  0    MIDAS Total Score: 8    On how many days in the last 3 months did you have a headache? (If a headache lasted more than 1 day, count each day.)   5    On a scale of 0 - 10, on average how painful were these headaches (where 0 = no pain at all, and 10 = pain as bad as it can be.)  7    Reason for visit -headache follow up   Interval History:  Initial Headache Clinic visit on 4/7/2021, see note for details.   Since last visit two migraine duration 1.5 hours  and 4 regular headaches.   First headache Sumatriptan oral+naproxen +compazine -headache improved  April 15 -sumatriptan inj +compazine +bendaryl and slept for 3 hours and woke better  Took Emgality yesterday -loading dose and feels good. No side  effects    Emgality for 4 months and about 5 migraine headaches in 4 months. Patient reports that when she is do to get her next dose she gets a migraine headache.   Sumatriptan helps  Tizanidine helps as needed     Propranolol stopped.     Refills for tizanidine, sumatriptan oral sent to pharmacy.     Follow up in 6 months or sooner if needed     No side effects to Emgality, tizanidine, compazine, sumatriptan or naproxen reported today. No changes in treatment plan.     Patient appears alert and no in apparent acute distress,  mentation appears normal, judgement and insight intact, normal speech.      14 minutes spent on the date of the encounter doing video access, chart  review, meds review, treatment plan review, documentation and further activities as noted above    RAVI Mckeon, CNP [facility]  Headache certified  [facility]        Again, thank you for allowing me to participate in the care of your patient.      Sincerely,    RAVI Rodriguez CNP

## 2021-09-05 ENCOUNTER — HEALTH MAINTENANCE LETTER (OUTPATIENT)
Age: 41
End: 2021-09-05

## 2021-11-01 ENCOUNTER — TELEPHONE (OUTPATIENT)
Dept: NEUROLOGY | Facility: CLINIC | Age: 41
End: 2021-11-01

## 2021-11-01 NOTE — TELEPHONE ENCOUNTER
Central Prior Authorization Team   Phone: [phone]    PA Initiation    Medication: galcanezumab-gnlm (EMGALITY) 120 MG/ML injection  Insurance Company: [employer] - Phone [phone] Fax [phone]  [facility] [address]  Filling Pharmacy Phone: [phone]  Filling Pharmacy Fax: [phone]  Start Date: 11/1/2021

## 2021-11-01 NOTE — TELEPHONE ENCOUNTER
Prior Authorization Retail Medication Request    Medication/Dose: galcanezumab-gnlm (EMGALITY) 120 MG/ML injection  ICD code (if different than what is on RX):     Previously Tried and Failed:     Rationale:       Insurance Name:  [employer]   Insurance ID:         Pharmacy Information (if different than what is on RX)  [facility]  Phone:  [phone]

## 2021-11-03 NOTE — TELEPHONE ENCOUNTER
Prior Authorization Approval    Authorization Effective Date: 10/1/2021  Authorization Expiration Date: 11/1/2022  Medication: galcanezumab-gnlm (EMGALITY) 120 MG/ML injection-APPROVED  Approved Dose/Quantity:    Reference #:     Insurance Company: "[employer]" - Phone [phone] Fax [phone]  Expected CoPay:       CoPay Card Available:      Foundation Assistance Needed:    Which Pharmacy is filling the prescription (Not needed for infusion/clinic administered): [facility] [address]  Pharmacy Notified: Yes  Patient Notified: Yes  **Instructed pharmacy to notify patient when script is ready to /ship.**

## 2021-11-24 DIAGNOSIS — G43.719 INTRACTABLE CHRONIC MIGRAINE WITHOUT AURA AND WITHOUT STATUS MIGRAINOSUS: Primary | ICD-10-CM

## 2021-11-24 RX ORDER — ELETRIPTAN HYDROBROMIDE 20 MG/1
20-40 TABLET, FILM COATED ORAL
Qty: 18 TABLET | Refills: 3 | Status: SHIPPED | OUTPATIENT
Start: 2021-11-24 | End: 2023-01-11

## 2021-11-26 ENCOUNTER — TELEPHONE (OUTPATIENT)
Dept: NEUROLOGY | Facility: CLINIC | Age: 41
End: 2021-11-26
Payer: COMMERCIAL

## 2021-11-26 NOTE — TELEPHONE ENCOUNTER
Prior Authorization Retail Medication Request    Medication/Dose: Rimegepant Sulfate 75 MG TBDP  ICD code (if different than what is on RX):    G43.719  Previously Tried and Failed:  Sumatriptan, naproxen, compazine, tizanidine, emgality,   Rationale:  migraine    Insurance Name:  [employer]  Insurance ID:  [id number]       Pharmacy Information (if different than what is on RX)  Name:    Phone:

## 2021-11-26 NOTE — TELEPHONE ENCOUNTER
PA Initiation    Medication: Rimegepant Sulfate (NURECT) 75 MG TBDP  Insurance Company: [employer] - Phone [phone] Fax [phone]  [id number] [place], [place] - [address] [address]  Filling Pharmacy Phone: [phone]  Filling Pharmacy Fax: [phone]  Start Date: 11/26/2021

## 2021-11-29 NOTE — TELEPHONE ENCOUNTER
Prior Authorization Approval    Authorization Effective Date: 10/27/2021  Authorization Expiration Date: 5/25/2022  Medication: Rimegepant Sulfate (NURECT) 75 MG TBDP--APPROVED  Approved Dose/Quantity:   Reference #:     Insurance Company: [employer] - Phone [phone] Fax [phone]  Expected CoPay:       CoPay Card Available:      Foundation Assistance Needed:    Which Pharmacy is filling the prescription (Not needed for infusion/clinic administered[id number] [place] [place] [facility] [address]  [address]  Pharmacy Notified: Yes  Patient Notified: Yes **Instructed pharmacy to notify patient when script is ready to /ship.**

## 2022-01-25 ENCOUNTER — TELEPHONE (OUTPATIENT)
Dept: NEUROLOGY | Facility: CLINIC | Age: 42
End: 2022-01-25
Payer: COMMERCIAL

## 2022-01-26 ENCOUNTER — VIRTUAL VISIT (OUTPATIENT)
Dept: NEUROLOGY | Facility: CLINIC | Age: 42
End: 2022-01-26
Payer: COMMERCIAL

## 2022-01-26 DIAGNOSIS — M62.838 MUSCLE SPASM: ICD-10-CM

## 2022-01-26 DIAGNOSIS — G43.719 INTRACTABLE CHRONIC MIGRAINE WITHOUT AURA AND WITHOUT STATUS MIGRAINOSUS: ICD-10-CM

## 2022-01-26 PROCEDURE — 99212 OFFICE O/P EST SF 10 MIN: CPT | Mod: GT | Performed by: NURSE PRACTITIONER

## 2022-01-26 RX ORDER — TIZANIDINE 2 MG/1
2-4 TABLET ORAL AT BEDTIME
Qty: 60 TABLET | Refills: 9 | Status: SHIPPED | OUTPATIENT
Start: 2022-01-26 | End: 2022-05-10

## 2022-01-26 RX ORDER — MELOXICAM 15 MG/1
TABLET ORAL
COMMUNITY
Start: 2022-01-20 | End: 2024-01-03

## 2022-01-26 RX ORDER — GALCANEZUMAB 120 MG/ML
120 INJECTION, SOLUTION SUBCUTANEOUS
Qty: 1 ML | Refills: 11 | Status: SHIPPED | OUTPATIENT
Start: 2022-01-26 | End: 2022-01-26

## 2022-01-26 RX ORDER — GALCANEZUMAB 120 MG/ML
120 INJECTION, SOLUTION SUBCUTANEOUS
Qty: 1 ML | Refills: 12 | Status: SHIPPED | OUTPATIENT
Start: 2022-01-26 | End: 2022-09-14

## 2022-01-26 ASSESSMENT — HEADACHE IMPACT TEST (HIT 6)
WHEN YOU HAVE HEADACHES HOW OFTEN IS THE PAIN SEVERE: RARELY
HOW OFTEN HAVE YOU FELT TOO TIRED TO WORK BECAUSE OF YOUR HEADACHES: NEVER
HOW OFTEN DO HEADACHES LIMIT YOUR DAILY ACTIVITIES: RARELY
HOW OFTEN DID HEADACHS LIMIT CONCENTRATION ON WORK OR DAILY ACTIVITY: NEVER
HIT6 TOTAL SCORE: 47
HOW OFTEN HAVE YOU FELT FED UP OR IRRITATED BECAUSE OF YOUR HEADACHES: NEVER
WHEN YOU HAVE A HEADACHE HOW OFTEN DO YOU WISH YOU COULD LIE DOWN: ALWAYS

## 2022-01-26 NOTE — LETTER
1/26/2022       RE: Bhavna Rich  [address] [address] [facility] [address]     Dear Colleague,    Thank you for referring your patient, Bhavna Rich, to the [facility] [place] [facility]. Please see a copy of my visit note below.    MIGRAINE DISABILITY ASSESSMENT (MIDAS)    On how many days in the last 3 months did you miss work or school because of your headaches?  0    How many days in the last 3 months was your productivity at work or school reduced by half or more because of your headaches? (Do not include days you counted in question 1 where you missed work or school.)  0    On how many days in the last 3 months did you not do household work (such as housework, home repairs and maintenance, shopping, caring for children and relatives) because of your headaches?  2    How many days in the last 3 months was your productivity in household work reduced by half or more because of your headaches? (Do not include days you counted in question 3 where you did not do household work).  0    On how many days in the last 3 months did you miss family, social, or lesiure activities because of your headaches?  0    MIDAS Total Score:     On how many days in the last 3 months did you have a headache? (If a headache lasted more than 1 day, count each day.)   7    On a scale of 0 - 10, on average how painful were these headaches (where 0 = no pain at all, and 10 = pain as bad as it can be.)  4    Last Patient-Answered HIT-6 Questionnaire  HIT-6 1/26/2022   When you have headaches, how often is the pain severe 8   How often do headaches limit your ability to do usual daily activities including household work, work, school, or social activities? 8   When you have a headache, how often do you wish you could lie down? 13   In the past 4 weeks, how often have you felt too tired to do work or daily activities because of your headaches 6   In the  past 4 weeks, how often have you felt fed up or irritated because of your headaches 6   In the past 4 weeks, how often did headaches limit your ability to concentrate on work or daily activities 6   HIT-6 Total Score 47       Has been doing very well and randome headaches and very rare. Hardly any after starting Emgality and no side effects.   Emgality works very well 90+% effective.   Tizanidine 4 mg at bedtime and helps and no side effects.   Rescue treatment -Nurtec but no need for rescue treatment at this time.   Would like to keep sumatriptan as needed but not really needed.     foot surgery and on meloxicam for foot post surgery    No changes in treatment plan  Emgality to continue and tizanidine refills provided  Rescue -Nurtec or sumatriptan as needed   Follow up in 9-12 months or sooner if needed     I discussed all my recommendations with Bhavna Rich who verbalizes understanding and comfortable with the plan.  All of patient's questions were answered from the best of my knowledge.  Patient is in agreement with the plan.     10 minutes spent on the date of the encounter doing video access, chart  review,   meds review, treatment plan, documentation and further activities as noted above      RAVI Mckeon, CNP [facility]  Headache certified  [facility]

## 2022-01-26 NOTE — PROGRESS NOTES
Bhavna is a 41 year old who is being evaluated via a billable video visit.      How would you like to obtain your AVS? MyChart  If the video visit is dropped, the invitation should be resent by: Send to e-mail at: [email or web address]  Will anyone else be joining your video visit? No      Video Start Time: 2:50 PM  Video-Visit Details    Type of service:  Video Visit    Video End Time:2:59 PM    Originating Location (pt. Location): Home    Distant Location (provider location):  [facility]UROLOG[facility]     Platform used for Video Visit: [facility]     MIGRAINE DISABILITY ASSESSMENT (MIDAS)    On how many days in the last 3 months did you miss work or school because of your headaches?  0    How many days in the last 3 months was your productivity at work or school reduced by half or more because of your headaches? (Do not include days you counted in question 1 where you missed work or school.)  0    On how many days in the last 3 months did you not do household work (such as housework, home repairs and maintenance, shopping, caring for children and relatives) because of your headaches?  2    How many days in the last 3 months was your productivity in household work reduced by half or more because of your headaches? (Do not include days you counted in question 3 where you did not do household work).  0    On how many days in the last 3 months did you miss family, social, or lesiure activities because of your headaches?  0    MIDAS Total Score:     On how many days in the last 3 months did you have a headache? (If a headache lasted more than 1 day, count each day.)   7    On a scale of 0 - 10, on average how painful were these headaches (where 0 = no pain at all, and 10 = pain as bad as it can be.)  4    Last Patient-Answered HIT-6 Questionnaire  HIT-6 1/26/2022   When you have headaches, how often is the pain severe 8   How often do headaches limit your ability to do usual daily activities  including household work, work, school, or social activities? 8   When you have a headache, how often do you wish you could lie down? 13   In the past 4 weeks, how often have you felt too tired to do work or daily activities because of your headaches 6   In the past 4 weeks, how often have you felt fed up or irritated because of your headaches 6   In the past 4 weeks, how often did headaches limit your ability to concentrate on work or daily activities 6   HIT-6 Total Score 47       Has been doing very well and randome headaches and very rare. Hardly any after starting Emgality and no side effects.   Emgality works very well 90+% effective.   Tizanidine 4 mg at bedtime and helps and no side effects.   Rescue treatment -Nurtec but no need for rescue treatment at this time.   Would like to keep sumatriptan as needed but not really needed.     foot surgery and on meloxicam for foot post surgery    No changes in treatment plan  Emgality to continue and tizanidine refills provided  Rescue -Nurtec or sumatriptan as needed   Follow up in 9-12 months or sooner if needed     I discussed all my recommendations with Bhavna Rich who verbalizes understanding and comfortable with the plan.  All of patient's questions were answered from the best of my knowledge.  Patient is in agreement with the plan.     10 minutes spent on the date of the encounter doing video access, chart  review,   meds review, treatment plan, documentation and further activities as noted above    RAVI Mckeon, CNP [facility]  Headache certified  [facility]

## 2022-02-20 ENCOUNTER — HEALTH MAINTENANCE LETTER (OUTPATIENT)
Age: 42
End: 2022-02-20

## 2022-03-30 DIAGNOSIS — G43.719 INTRACTABLE CHRONIC MIGRAINE WITHOUT AURA AND WITHOUT STATUS MIGRAINOSUS: ICD-10-CM

## 2022-03-30 RX ORDER — SUMATRIPTAN 100 MG/1
TABLET, FILM COATED ORAL
Qty: 12 TABLET | Refills: 9 | Status: SHIPPED | OUTPATIENT
Start: 2022-03-30 | End: 2023-01-11

## 2022-03-30 NOTE — TELEPHONE ENCOUNTER
Rx Authorization:  Requested Medication/ Dose SUMATRIPTAN SUCC 100 MG TABLET  Date last refill ordered: 4/13/21  Quantity ordered: 12 tabs  # refills: 9  Date of last clinic visit with ordering provider: 1/26/22  Date of next clinic visit with ordering provider:   All pertinent protocol data (lab date/result):   Include pertinent information from patients message:

## 2022-05-10 DIAGNOSIS — G43.719 INTRACTABLE CHRONIC MIGRAINE WITHOUT AURA AND WITHOUT STATUS MIGRAINOSUS: ICD-10-CM

## 2022-05-10 DIAGNOSIS — M62.838 MUSCLE SPASM: ICD-10-CM

## 2022-05-10 RX ORDER — TIZANIDINE 2 MG/1
4 TABLET ORAL 2 TIMES DAILY
Qty: 120 TABLET | Refills: 3 | Status: SHIPPED | OUTPATIENT
Start: 2022-05-10 | End: 2022-08-17

## 2022-06-08 DIAGNOSIS — G43.719 INTRACTABLE CHRONIC MIGRAINE WITHOUT AURA AND WITHOUT STATUS MIGRAINOSUS: ICD-10-CM

## 2022-06-08 DIAGNOSIS — M62.838 MUSCLE SPASM: ICD-10-CM

## 2022-06-08 RX ORDER — TIZANIDINE 2 MG/1
4 TABLET ORAL 2 TIMES DAILY
Qty: 120 TABLET | Refills: 3 | OUTPATIENT
Start: 2022-06-08

## 2022-08-15 ENCOUNTER — CARE COORDINATION (OUTPATIENT)
Dept: NEUROLOGY | Facility: CLINIC | Age: 42
End: 2022-08-15

## 2022-08-15 NOTE — PROGRESS NOTES
Emgality patient assistance form filled out and signed by So.  Faxed back to Chloe Rogel.  [phone]

## 2022-08-17 DIAGNOSIS — M62.838 MUSCLE SPASM: ICD-10-CM

## 2022-08-17 DIAGNOSIS — G43.719 INTRACTABLE CHRONIC MIGRAINE WITHOUT AURA AND WITHOUT STATUS MIGRAINOSUS: ICD-10-CM

## 2022-08-18 DIAGNOSIS — M62.838 MUSCLE SPASM: ICD-10-CM

## 2022-08-18 DIAGNOSIS — G43.719 INTRACTABLE CHRONIC MIGRAINE WITHOUT AURA AND WITHOUT STATUS MIGRAINOSUS: ICD-10-CM

## 2022-08-19 RX ORDER — TIZANIDINE 2 MG/1
4-8 TABLET ORAL AT BEDTIME
Qty: 360 TABLET | Refills: 1 | Status: SHIPPED | OUTPATIENT
Start: 2022-08-19 | End: 2022-09-19

## 2022-08-19 RX ORDER — TIZANIDINE 2 MG/1
4 TABLET ORAL 2 TIMES DAILY
Qty: 360 TABLET | Refills: 1 | OUTPATIENT
Start: 2022-08-19

## 2022-08-23 ENCOUNTER — TELEPHONE (OUTPATIENT)
Dept: NEUROLOGY | Facility: CLINIC | Age: 42
End: 2022-08-23

## 2022-09-02 ENCOUNTER — CARE COORDINATION (OUTPATIENT)
Dept: NEUROLOGY | Facility: CLINIC | Age: 42
End: 2022-09-02

## 2022-09-02 NOTE — PROGRESS NOTES
I called [facility] Patient Assistance program to ask for an update on patient application. Pt denied as she has private insurance. Pt updated.     Zara GARZON

## 2022-09-14 ENCOUNTER — TELEPHONE (OUTPATIENT)
Dept: NEUROLOGY | Facility: CLINIC | Age: 42
End: 2022-09-14

## 2022-09-14 DIAGNOSIS — G43.719 INTRACTABLE CHRONIC MIGRAINE WITHOUT AURA AND WITHOUT STATUS MIGRAINOSUS: Primary | ICD-10-CM

## 2022-09-14 RX ORDER — ATOGEPANT 60 MG/1
60 TABLET ORAL DAILY
Qty: 30 TABLET | Refills: 11 | Status: SHIPPED | OUTPATIENT
Start: 2022-09-14 | End: 2022-11-17

## 2022-09-14 NOTE — TELEPHONE ENCOUNTER
Prior Authorization Retail Medication Request    Medication/Dose: Atogepant (QULIPTA) 60 MG TABS  ICD code (if different than what is on RX):      Previously Tried and Failed:  emgality - helpful but no longer affordable     Propranolol stopped  Occipital nerve blocks twice at [facility] but did not work mid-end March  Flexeril -did not help  Duloxetine 90 mg for depression for a couple of years and helps with depression  Gabapentin 100 mg TID per psychiatry and was increased 200 mg TID and caused side effects-stuttering and stopped gabapentin  lorazapam per psychiatry for panic attacks but it does not help  Medrol pack for headaches and did not help and no side effects  Mirtazapine and trazadone as needed for insomnia  Prochlorperazine 10 mg -tried once did help before but not last Saturday  Quetiapine 75 mg am and at bedtime  Rizatriptan tried twice and did not help  Sumatriptan -stopped working  Rationale:  migraine    Insurance Name:  [employer]  Insurance ID:  [id number]       Pharmacy Information (if different than what is on RX)  Name:    Phone:

## 2022-09-14 NOTE — TELEPHONE ENCOUNTER
Central Prior Authorization Team   Phone: [phone]      PA Initiation    Medication: Atogepant (QULIPTA) 60 MG TABS  Insurance Company: [employer] - Phone [phone] Fax [phone]  [id number] [place], [place] - [address] [address]  Filling Pharmacy Phone: [phone]  Filling Pharmacy Fax:    Start Date: 9/14/2022

## 2022-09-15 ENCOUNTER — TELEPHONE (OUTPATIENT)
Dept: NEUROLOGY | Facility: CLINIC | Age: 42
End: 2022-09-15

## 2022-09-15 RX ORDER — FREMANEZUMAB-VFRM 225 MG/1.5ML
225 INJECTION SUBCUTANEOUS
Qty: 1.5 ML | Refills: 11 | Status: SHIPPED | OUTPATIENT
Start: 2022-09-15 | End: 2022-09-19

## 2022-09-15 NOTE — TELEPHONE ENCOUNTER
PRIOR AUTHORIZATION DENIED    Medication: Atogepant (QULIPTA) 60 MG TABS-DENIED    Denial Date: 9/15/2022    Denial Rational:         Appeal Information:

## 2022-09-15 NOTE — TELEPHONE ENCOUNTER
Prior Authorization Retail Medication Request    Medication/Dose: Fremanezumab-vfrm (AJOVY) 225 MG/1.5ML SOAJ  ICD code (if different than what is on RX):    Previously Tried and Failed:  emgality - helpful but no longer affordable     Propranolol stopped  Occipital nerve blocks twice at [facility] but did not work mid-end March  Flexeril -did not help  Duloxetine 90 mg for depression for a couple of years and helps with depression  Gabapentin 100 mg TID per psychiatry and was increased 200 mg TID and caused side effects-stuttering and stopped gabapentin  lorazapam per psychiatry for panic attacks but it does not help  Medrol pack for headaches and did not help and no side effects  Mirtazapine and trazadone as needed for insomnia  Prochlorperazine 10 mg -tried once did help before but not last Saturday  Quetiapine 75 mg am and at bedtime  Rizatriptan tried twice and did not help  Sumatriptan -stopped working  Rationale:  migraine     Insurance Name:  [employer]  Insurance ID:  [id number]         Pharmacy Information (if different than what is on RX)  Name:    Phone:

## 2022-09-15 NOTE — TELEPHONE ENCOUNTER
Central Prior Authorization Team   Phone: [phone]      PA Initiation    Medication: Fremanezumab-vfrm (AJOVY) 225 MG/1.5ML SOAJ  Insurance Company: [email or web address] - Phone [phone] Fax [phone]  [id number] [place], [place] - [address]  [address]  Filling Pharmacy Phone: [phone]  Filling Pharmacy Fax:    Start Date: 9/15/2022

## 2022-09-18 DIAGNOSIS — G43.719 INTRACTABLE CHRONIC MIGRAINE WITHOUT AURA AND WITHOUT STATUS MIGRAINOSUS: ICD-10-CM

## 2022-09-18 DIAGNOSIS — M62.838 MUSCLE SPASM: ICD-10-CM

## 2022-09-19 DIAGNOSIS — G43.719 INTRACTABLE CHRONIC MIGRAINE WITHOUT AURA AND WITHOUT STATUS MIGRAINOSUS: ICD-10-CM

## 2022-09-19 RX ORDER — FREMANEZUMAB-VFRM 225 MG/1.5ML
225 INJECTION SUBCUTANEOUS
Qty: 1.5 ML | Refills: 11 | Status: SHIPPED | OUTPATIENT
Start: 2022-09-19 | End: 2023-01-17

## 2022-09-19 RX ORDER — TIZANIDINE 2 MG/1
TABLET ORAL
Qty: 180 TABLET | Refills: 3 | Status: SHIPPED | OUTPATIENT
Start: 2022-09-19 | End: 2022-11-17 | Stop reason: DRUGHIGH

## 2022-09-19 NOTE — TELEPHONE ENCOUNTER
Rx Authorization:  Requested Medication/ Dose Tizanadine 2mg  Date last refill ordered: 8/19/22  Quantity ordered:   # refills: 1  Date of last clinic visit with ordering provider:   Date of next clinic visit with ordering provider:   All pertinent protocol data (lab date/result):   Include pertinent information from patients message:

## 2022-09-19 NOTE — TELEPHONE ENCOUNTER
Prior Authorization Approval    Authorization Effective Date: 8/16/2022  Authorization Expiration Date: 9/16/2023  Medication: Fremanezumab-vfrm (AJOVY) 225 MG/1.5ML SOAJ-APPROVED  Approved Dose/Quantity:   Reference #:     Insurance Company: [employer] - Phone [phone] Fax [phone]  Expected CoPay:       CoPay Card Available:      Foundation Assistance Needed:    Which Pharmacy is filling the prescription (Not needed for infusion/clinic administered): [id number] - [place], [place] - [address]  [address]   Pharmacy Notified: Yes  Patient Notified: No    Per insurance, medication may be required to be filled at [facility]. Phone [phone] Fax [phone]. Called [facility] pharmacy in [place] and when they processed the script, they get a rejection that it needs to be filled at [facility].

## 2022-10-03 NOTE — TELEPHONE ENCOUNTER
PA Initiation    Medication: emgality 120 mg every 28 days  Insurance Company: [employer] - Phone [phone] Fax [phone]  Pharmacy Filling the Rx: [facility] - [place], [place] - [address]  Filling Pharmacy Phone: [phone]  Filling Pharmacy Fax:    Start Date: 5/11/2021    Central Prior Authorization Team   Phone: [phone]         What Type Of Note Output Would You Prefer (Optional)?: Standard Output Hpi Title: Evaluation of Skin Lesions How Severe Are Your Spot(S)?: mild Have Your Spot(S) Been Treated In The Past?: has not been treated Additional History: Patient states she has a spot of concern located on her chest and left lower extremity.

## 2022-10-23 ENCOUNTER — HEALTH MAINTENANCE LETTER (OUTPATIENT)
Age: 42
End: 2022-10-23

## 2022-11-16 ENCOUNTER — HOSPITAL ENCOUNTER (OUTPATIENT)
Dept: MAMMOGRAPHY | Facility: CLINIC | Age: 42
Discharge: HOME | End: 2022-11-16
Attending: FAMILY MEDICINE | Admitting: FAMILY MEDICINE
Payer: COMMERCIAL

## 2022-11-16 DIAGNOSIS — Z12.31 VISIT FOR SCREENING MAMMOGRAM: ICD-10-CM

## 2022-11-16 PROCEDURE — 77067 SCR MAMMO BI INCL CAD: CPT

## 2022-11-17 DIAGNOSIS — M62.838 MUSCLE SPASM: Primary | ICD-10-CM

## 2022-11-17 DIAGNOSIS — G43.719 INTRACTABLE CHRONIC MIGRAINE WITHOUT AURA AND WITHOUT STATUS MIGRAINOSUS: ICD-10-CM

## 2022-11-17 RX ORDER — PROPRANOLOL HYDROCHLORIDE 80 MG/1
80 CAPSULE, EXTENDED RELEASE ORAL AT BEDTIME
Qty: 30 CAPSULE | Refills: 3 | Status: SHIPPED | OUTPATIENT
Start: 2022-11-17 | End: 2022-11-18

## 2022-11-17 RX ORDER — TIZANIDINE HYDROCHLORIDE 4 MG/1
8-16 CAPSULE, GELATIN COATED ORAL AT BEDTIME
Qty: 90 CAPSULE | Refills: 3 | Status: SHIPPED | OUTPATIENT
Start: 2022-11-17 | End: 2022-11-18

## 2022-11-18 DIAGNOSIS — M62.838 MUSCLE SPASM: ICD-10-CM

## 2022-11-18 DIAGNOSIS — G43.719 INTRACTABLE CHRONIC MIGRAINE WITHOUT AURA AND WITHOUT STATUS MIGRAINOSUS: ICD-10-CM

## 2022-11-18 RX ORDER — PROPRANOLOL HYDROCHLORIDE 80 MG/1
80 CAPSULE, EXTENDED RELEASE ORAL AT BEDTIME
Qty: 30 CAPSULE | Refills: 3 | Status: SHIPPED | OUTPATIENT
Start: 2022-11-18 | End: 2023-01-11

## 2022-11-18 RX ORDER — TIZANIDINE HYDROCHLORIDE 4 MG/1
8-16 CAPSULE, GELATIN COATED ORAL AT BEDTIME
Qty: 90 CAPSULE | Refills: 3 | Status: SHIPPED | OUTPATIENT
Start: 2022-11-18 | End: 2022-11-21

## 2022-11-18 NOTE — TELEPHONE ENCOUNTER
[facility] Call Center    Phone Message    May a detailed message be left on voicemail: yes     Reason for Call: Other: Patient is calling in regards to refill request. States insurance will only cover the tablet and not the capsule. Please contact patient to discuss at [phone]     Action Taken: Message routed to:  [facility] Neurology    Travel Screening: Not Applicable

## 2022-11-21 DIAGNOSIS — M62.838 MUSCLE SPASM: Primary | ICD-10-CM

## 2022-11-21 RX ORDER — TIZANIDINE HYDROCHLORIDE 4 MG/1
CAPSULE, GELATIN COATED ORAL
Qty: 90 CAPSULE | Refills: 3 | OUTPATIENT
Start: 2022-11-21

## 2022-12-10 DIAGNOSIS — G43.719 INTRACTABLE CHRONIC MIGRAINE WITHOUT AURA AND WITHOUT STATUS MIGRAINOSUS: ICD-10-CM

## 2022-12-12 RX ORDER — PROPRANOLOL HYDROCHLORIDE 80 MG/1
80 CAPSULE, EXTENDED RELEASE ORAL AT BEDTIME
Qty: 30 CAPSULE | Refills: 3 | OUTPATIENT
Start: 2022-12-12

## 2023-01-04 ENCOUNTER — TELEPHONE (OUTPATIENT)
Dept: NEUROLOGY | Facility: CLINIC | Age: 43
End: 2023-01-04

## 2023-01-04 NOTE — TELEPHONE ENCOUNTER
I returned a call to the pharmacy and was put on hold for 10 min  I could not hold any longer and hung up.  I will wait for the pharmacy to call us back.

## 2023-01-04 NOTE — TELEPHONE ENCOUNTER
[facility] Call Center    Phone Message    May a detailed message be left on voicemail: yes     Reason for Call: Medication Question or concern regarding medication   Prescription Clarification  Name of Medication: tiZANidine (ZANAFLEX) 4 MG tablet  Prescribing Provider: So Cortes Pharmacy: [id number] - [facility] [address]  [address]   What on the order needs clarification? Jaimie from [facility] is calling wanting to know if the pharmacy should be filling this medication for the patient for the tiZANidine (ZANAFLEX) 4 MG tablet and tiZANidine (ZANAFLEX) 2 MG tablet. Jaimie states that tiZANidine (ZANAFLEX) 4 MG tablet was just filled for the patient on 1/1/23. Please call pharmacy back to advise.    Action Taken: Message routed to:  [facility] Neurology    Travel Screening: Not Applicable

## 2023-01-10 DIAGNOSIS — G43.719 INTRACTABLE CHRONIC MIGRAINE WITHOUT AURA AND WITHOUT STATUS MIGRAINOSUS: ICD-10-CM

## 2023-01-10 DIAGNOSIS — G43.009 MIGRAINE WITHOUT AURA AND WITHOUT STATUS MIGRAINOSUS, NOT INTRACTABLE: Primary | ICD-10-CM

## 2023-01-10 NOTE — TELEPHONE ENCOUNTER
Rx Authorization:    Requested Medication/ DoseSUMAtriptan (IMITREX) 100 MG tablet     Date last refill ordered: 3/30/22    Quantity ordered: 12 tablets    # refills: 9    Date of last clinic visit with ordering provider: 1/26/22    Date of next clinic visit with ordering provider:1/11/23    All pertinent protocol data (lab date/result):     Include pertinent information from patients message:

## 2023-01-11 ENCOUNTER — TELEPHONE (OUTPATIENT)
Dept: NEUROLOGY | Facility: CLINIC | Age: 43
End: 2023-01-11

## 2023-01-11 ENCOUNTER — VIRTUAL VISIT (OUTPATIENT)
Dept: NEUROLOGY | Facility: CLINIC | Age: 43
End: 2023-01-11
Payer: COMMERCIAL

## 2023-01-11 DIAGNOSIS — G43.719 INTRACTABLE CHRONIC MIGRAINE WITHOUT AURA AND WITHOUT STATUS MIGRAINOSUS: ICD-10-CM

## 2023-01-11 DIAGNOSIS — G43.009 MIGRAINE WITHOUT AURA AND WITHOUT STATUS MIGRAINOSUS, NOT INTRACTABLE: Primary | ICD-10-CM

## 2023-01-11 PROCEDURE — 99213 OFFICE O/P EST LOW 20 MIN: CPT | Mod: GT | Performed by: NURSE PRACTITIONER

## 2023-01-11 RX ORDER — PROCHLORPERAZINE MALEATE 10 MG
10 TABLET ORAL EVERY 6 HOURS PRN
Qty: 20 TABLET | Refills: 3 | Status: SHIPPED | OUTPATIENT
Start: 2023-01-11 | End: 2024-01-03

## 2023-01-11 RX ORDER — SUMATRIPTAN 100 MG/1
TABLET, FILM COATED ORAL
Qty: 12 TABLET | Refills: 9 | OUTPATIENT
Start: 2023-01-11

## 2023-01-11 RX ORDER — SUMATRIPTAN 100 MG/1
TABLET, FILM COATED ORAL
Qty: 12 TABLET | Refills: 9 | Status: SHIPPED | OUTPATIENT
Start: 2023-01-11 | End: 2024-01-03

## 2023-01-11 RX ORDER — PROPRANOLOL HYDROCHLORIDE 80 MG/1
80 CAPSULE, EXTENDED RELEASE ORAL AT BEDTIME
Qty: 30 CAPSULE | Refills: 9 | Status: SHIPPED | OUTPATIENT
Start: 2023-01-11 | End: 2023-12-19

## 2023-01-11 ASSESSMENT — MIGRAINE DISABILITY ASSESSMENT (MIDAS)
HOW OFTEN WERE SOCIAL ACTIVITIES MISSED DUE TO HEADACHES: 0
HOW MANY DAYS IN THE PAST 3 MONTHS HAVE YOU HAD A HEADACHE: 7
HOW MANY DAYS DID YOU NOT DO HOUSEWORK BECAUSE OF HEADACHES: 5
HOW MANY DAYS WAS HOUSEWORK PRODUCTIVITY CUT IN HALF DUE TO HEADACHES: 5
HOW MANY DAYS DID YOU MISS WORK OR SCHOOL BECAUSE OF HEADACHES: 0
HOW MANY DAYS WAS YOUR PRODUCTIVITY CUT IN HALF BECAUSE OF HEADACHES: 5
ON A SCALE FROM 0-10 ON AVERAGE HOW PAINFUL WERE HEADACHES: 5
TOTAL SCORE: 15

## 2023-01-11 ASSESSMENT — HEADACHE IMPACT TEST (HIT 6)
HOW OFTEN DID HEADACHS LIMIT CONCENTRATION ON WORK OR DAILY ACTIVITY: RARELY
HIT6 TOTAL SCORE: 55
HOW OFTEN DO HEADACHES LIMIT YOUR DAILY ACTIVITIES: SOMETIMES
WHEN YOU HAVE A HEADACHE HOW OFTEN DO YOU WISH YOU COULD LIE DOWN: VERY OFTEN
HOW OFTEN HAVE YOU FELT TOO TIRED TO WORK BECAUSE OF YOUR HEADACHES: RARELY
HOW OFTEN HAVE YOU FELT FED UP OR IRRITATED BECAUSE OF YOUR HEADACHES: RARELY
WHEN YOU HAVE HEADACHES HOW OFTEN IS THE PAIN SEVERE: SOMETIMES

## 2023-01-11 NOTE — LETTER
1/11/2023       RE: Bhavna Rcih  [address] [facility] [address]     Dear Colleague,    Thank you for referring your patient, Bhavna Rich, to the [facility] [place] at . Please see a copy of my visit note below.    [facility]:  Ajovy for a couple months and it has been effective. Keeps migraine headaches down. Had only two migraines since changing to Ajovy and used sumatriptan +compazine+benedryl +2 hours later advil +lay down and helped.   Week of continuous headache and tension -went to a message therapist and it helped.   Nurte has not needed it because uses sumatriptan.   Refilled Nurtec, sumatriptan, prochlorperazine, propranolol   Duloxetine 90 mg daily and prescribed by psychiatry  Tizanidine up to 16 mg at night and no side effects but causes drowsiness and cannot tell whether effective but does not want to change anything  No side effects with any medications and doing Ok now.   No changes in treatment plan except try Nurtec.     Follow [facility] 6-12 months or sooner [facility]     Patient is alert and no in apparent acute distress,  mentation appears normal, judgement and insight intact, normal speech.    I discussed all my recommendations with Bhavna Rich who verbalizes understanding and comfortable with the plan.  All of patient's questions were answered from the best of my knowledge.  Patient is in agreement with the plan.     24 minutes spent on the date of the encounter doing video access, chart  review, meds review, treatment plan, documentation and further activities as noted above    RAVI Mckeon, CNP [facility]  Headache certified  [facility]

## 2023-01-11 NOTE — PROGRESS NOTES
Bhavna is a 42 year old who is being evaluated via a billable video visit.      How would you like to obtain your AVS? USEUMhart  If the video visit is dropped, the invitation should be resent by: Text to cell phone: [phone]  Will anyone else be joining your video visit? No        Video-Visit Details    Type of service:  Video Visit   Start 1:40 PM  End 2:06 PM    Originating Location (pt. Location): Home  Distant Location (provider location):  On-site  Platform used for Video Visit: StyleSeat     [facility] follow up visit:  Ajovy for a couple months and it has been effective. Keeps migraine headaches down. Had only two migraines since changing to Ajovy and used sumatriptan +compazine+benedryl +2 hours later advil +lay down and helped.   Week of continuous headache and tension -went to a message therapist and it helped.   Nurtec has not needed it because uses sumatriptan.   Refilled Nurtec, sumatriptan, prochlorperazine, propranolol   Duloxetine 90 mg daily and prescribed by psychiatry  Tizanidine up to 16 mg at night and no side effects but causes drowsiness and cannot tell whether effective but does not want to change anything  No side effects with any medications and doing Ok now.   No changes in treatment plan except try Nurtec.     Follow up 6-12 months or sooner if needed via [employer]     Patient is alert and no in apparent acute distress,  mentation appears normal, judgement and insight intact, normal speech.    I discussed all my recommendations with Bhavna Rich who verbalizes understanding and comfortable with the plan.  All of patient's questions were answered from the best of my knowledge.  Patient is in agreement with the plan.     24 minutes spent on the date of the encounter doing video access, chart  review, meds review, treatment plan, documentation and further activities as noted above    RAVI Mckeon, CNP [facility]  Headache certified  [facility]

## 2023-01-17 ENCOUNTER — TELEPHONE (OUTPATIENT)
Dept: NEUROLOGY | Facility: CLINIC | Age: 43
End: 2023-01-17
Payer: COMMERCIAL

## 2023-01-17 DIAGNOSIS — G43.719 INTRACTABLE CHRONIC MIGRAINE WITHOUT AURA AND WITHOUT STATUS MIGRAINOSUS: Primary | ICD-10-CM

## 2023-01-17 NOTE — TELEPHONE ENCOUNTER
Prior Authorization Retail Medication Request    Medication/Dose: Emgality 240 mg loading dose then 120 mg every 28 days  ICD code (if different than what is on RX):  G43.009  Previously Tried and Failed:     Propranolol stopped  Occipital nerve blocks twice at [facility] but did not work mid-end March  Flexeril -did not help  Duloxetine 90 mg for depression for a couple of years and helps with depression  Gabapentin 100 mg TID per psychiatry and was increased 200 mg TID and caused side effects-stuttering and stopped gabapentin  lorazapam per psychiatry for panic attacks but it does not help  Medrol pack for headaches and did not help and no side effects  Mirtazapine and trazadone as needed for insomnia  Prochlorperazine 10 mg -tried once did help before but not last Saturday  Quetiapine 75 mg am and at bedtime  Rizatriptan tried twice and did not help  Sumatriptan -stopped working    Rationale:  CAN'T AFFORD AJOVY.  migraine     Insurance Name:  [employer]  Insurance ID:  [id number]     Pharmacy Information (if different than what is on RX)  Name:    Phone:

## 2023-01-18 DIAGNOSIS — G43.719 INTRACTABLE CHRONIC MIGRAINE WITHOUT AURA AND WITHOUT STATUS MIGRAINOSUS: ICD-10-CM

## 2023-01-19 NOTE — TELEPHONE ENCOUNTER
Central Prior Authorization Team   Phone: [phone]    PA Initiation    Medication: Emgality 240 mg loading dose then 120 mg every 28 days  Insurance Company: [employer] - Phone [phone] Fax [phone]  Pharmacy Filling the Rx: [facility] - [place], [place] - [address]  Filling Pharmacy Phone: [phone]  Filling Pharmacy Fax: [phone]  Start Date: 1/19/2023

## 2023-01-19 NOTE — TELEPHONE ENCOUNTER
Prior Authorization Not Needed per Insurance    Medication: Emgality 240 mg loading dose then 120 mg every 28 days-PA NOT NEEDED   Insurance Company: [employer] - Phone [phone] Fax [phone]  Expected CoPay:      Pharmacy Filling the Rx: [facility] - [place], [place] - [address]  Pharmacy Notified: No  Patient Notified: No    Insurance states that PA is Not Needed and medication is covered. Prior PA Approval on File Until 11/1/2022-12/1/2023. Medication is Approved at up to FDA-approved dose of 120 MG once monthly as noted below. Filling pharmacy need script in order to process medication.

## 2023-01-23 ENCOUNTER — TELEPHONE (OUTPATIENT)
Dept: NEUROLOGY | Facility: CLINIC | Age: 43
End: 2023-01-23
Payer: COMMERCIAL

## 2023-01-23 NOTE — TELEPHONE ENCOUNTER
Prior Authorization Retail Medication Request    Medication/Dose: Nurtec 75 mg  ICD code (if different than what is on RX):  G43.009  Previously Tried and Failed:     Propranolol stopped  Occipital nerve blocks twice at [facility] but did not work mid-end March  Flexeril -did not help  Duloxetine 90 mg for depression for a couple of years and helps with depression  Gabapentin 100 mg TID per psychiatry and was increased 200 mg TID and caused side effects-stuttering and stopped gabapentin  lorazapam per psychiatry for panic attacks but it does not help  Medrol pack for headaches and did not help and no side effects  Mirtazapine and trazadone as needed for insomnia  Prochlorperazine 10 mg -tried once did help before but not last Saturday  Quetiapine 75 mg am and at bedtime  Rizatriptan tried twice and did not help  Sumatriptan -stopped working     Rationale:  CAN'T AFFORD AJOVY.  migraine     Insurance Name:  [employer]  Insurance ID:  [id number]     Pharmacy Information (if different than what is on RX)  Name:    Phone:

## 2023-01-25 ENCOUNTER — TELEPHONE (OUTPATIENT)
Dept: NEUROLOGY | Facility: CLINIC | Age: 43
End: 2023-01-25
Payer: COMMERCIAL

## 2023-01-25 NOTE — TELEPHONE ENCOUNTER
Called insurance and Rep Nicolle stated that PA Approval on file is for 1 ml per 30 days.Nicolle stated that patient has been on medication before.  Please verify if patient is starting on medication again and is requiring Loading dose 2 ml per 30 days or if medication is a continuation dose of 1 ml per 30 days. If requesting for 2 ml for 30 day (Loading dose) PA is Needed with Rationale/clinical notes why patient is now needing 2 ml for 30 day supply. Called pharmacy at [phone] and Rep Anthony stated that there is a set up for shipment for 1 ml per 30 days on 2/1/2023; however, the request for 2 ml for 30 day is pending due to PA is Needed.

## 2023-01-25 NOTE — TELEPHONE ENCOUNTER
Prior Authorization Retail Medication Request    Medication/Dose: galcanezumab-gnlm (EMGALITY) 120 MG/ML injection 2 mL 11 1/18/2023  No  Sig: Inject 240 mg subcutaneous first month and then inject 120 mg subcutaneous every 28 days      ICD code (if different than what is on RX):    Previously Tried and Failed: Propranolol stopped  Occipital nerve blocks twice at [facility] but did not work mid-end March  Flexeril -did not help  Duloxetine 90 mg for depression for a couple of years and helps with depression  Gabapentin 100 mg TID per psychiatry and was increased 200 mg TID and caused side effects-stuttering and stopped gabapentin  lorazapam per psychiatry for panic attacks but it does not help  Medrol pack for headaches and did not help and no side effects  Mirtazapine and trazadone as needed for insomnia  Prochlorperazine 10 mg -tried once did help before but not last Saturday  Quetiapine 75 mg am and at bedtime  Rizatriptan tried twice and did not help  Sumatriptan -stopped working     Rationale:  CAN'T AFFORD AJOVY.  migraine       Insurance Name: [employer]  Insurance ID: [id number]        Pharmacy Information (if different than what is on RX)  Name:    Phone:

## 2023-01-25 NOTE — TELEPHONE ENCOUNTER
Pt has sent the following my chart message:      I am restarting the Emgality.  I have been taking Ajovy for 4 months.but I can t afford the Ajovy ar $400 a month    Sent to PA team.

## 2023-01-25 NOTE — TELEPHONE ENCOUNTER
Central Prior Authorization Team   Phone: [phone]    PA Initiation    Medication: Nurtec 75 mg  Insurance Company: [employer] - Phone [phone] Fax [phone]  Pharmacy Filling the Rx: [id number] - [place], [place] - [address]  Filling Pharmacy Phone: [phone]  Filling Pharmacy Fax: [phone]  Start Date: 1/25/2023

## 2023-01-25 NOTE — TELEPHONE ENCOUNTER
Per 1/17/23 correspondence prior auth for Emgality not needed.  PA team to review and advise.            We received the following my chart message from pt:    The pharmacy is saying that I need a PA for the Emgality now . I thought it was being delivered today but I guess not .      This is what they sent me           [facility]  Today, 3:30 pm  Gerardo Huggins, thank you for using Secure Mess[employer]. Upon review of your Emgality order, I see your insurance is requiring a prior authorization. They need to know that it's clinically necessary for you to take this medication in order for them to pay for the Emgality. Once approved we can process your order and send your medication out. Since your insurance is a completely separate entity, we have no time frame that we can provide you, nor do we have any say in the approval of your prior authorization. You may want to follow up with your doctor regarding the status. The timeliness of your medication delivery is of utmost importance. Please let us know if we can be of further assistance. Thank you, Your Care Team

## 2023-01-26 NOTE — TELEPHONE ENCOUNTER
Central Prior Authorization Team   Phone: [phone]    PA Initiation    Medication: galcanezumab-gnlm (EMGALITY) 120 MG/ML injection (LOADING DOSE)   Insurance Company: [employer] - Phone [phone] Fax [phone]  Pharmacy Filling the Rx: [facility] - [place], [place] - [address]  Filling Pharmacy Phone: [phone]  Filling Pharmacy Fax: [phone]  Start Date: 1/26/2023

## 2023-01-27 NOTE — TELEPHONE ENCOUNTER
Central Prior Authorization Team   Phone: [phone]    PA Re-Initiation    Medication: galcanezumab-gnlm (EMGALITY) 120 MG/ML injection (LOADING DOSE)   Insurance Company: [employer] - Phone [phone] Fax [phone]  Pharmacy Filling the Rx: [facility] - [place], [place] - [address]  Filling Pharmacy Phone: [phone]  Filling Pharmacy Fax: [phone]  Start Date: 1/26/2023    Re-initiate PA by phone with Rep Ram. Case # [id number]

## 2023-01-27 NOTE — TELEPHONE ENCOUNTER
Prior Authorization Approval    Authorization Effective Date: 1/1/2023  Authorization Expiration Date: 1/25/2024  Medication: Nurtec 75 mg  Approved Dose/Quantity: QUANTITY LIMIT OF 8 TABLETS PER 30 DAYS   Reference #:     Insurance Company: [employer] - Phone [phone] Fax [phone]  Expected CoPay: $958.76      CoPay Card Available:      Foundation Assistance Needed:    Which Pharmacy is filling the prescription (Not needed for infusion/clinic administered): [id number] - [place], [place] - [address]  [facility]  Pharmacy Notified: Yes- **Instructed pharmacy to notify patient when script is ready to /ship.** -Pharmacy stated that they have a paid claim on medication quantity 8 for 30 day supply; however, co-pay is high. Pharmacy stated that co-pay is high possibly due to patients deductible that has not met. Requested pharmacy to contact patient on cost prior to picking up medication.   Patient Notified: Yes

## 2023-01-30 NOTE — TELEPHONE ENCOUNTER
Prior Authorization Approval    Authorization Effective Date: 1/1/2023  Authorization Expiration Date: 1/27/2024  Medication: galcanezumab-gnlm (EMGALITY) 120 MG/ML injection (LOADING DOSE) -PA APPROVED   Approved Dose/Quantity:   Reference #:     Insurance Company: [employer] - Phone [phone] Fax [phone]  Expected CoPay:       CoPay Card Available:      Foundation Assistance Needed:    Which Pharmacy is filling the prescription (Not needed for infusion/clinic administered): [facility] - [place], [place] - [address] [facility]  Pharmacy Notified: Yes- **Instructed pharmacy to notify patient when script is ready to /ship.**  Patient Notified: Yes

## 2023-04-02 ENCOUNTER — HEALTH MAINTENANCE LETTER (OUTPATIENT)
Age: 43
End: 2023-04-02

## 2023-04-05 DIAGNOSIS — M62.838 MUSCLE SPASM: ICD-10-CM

## 2023-04-05 NOTE — TELEPHONE ENCOUNTER
Rx Authorization:  Requested Medication/ Dose TIZANIDINE HCL 4 MG TABLET  Date last refill ordered:11/20/22  Quantity ordered: 90 tabs  # refills: 6  Date of last clinic visit with ordering provider: 1/11/23  Date of next clinic visit with ordering provider:   All pertinent protocol data (lab date/result):   Include pertinent information from patients message:

## 2023-04-06 ENCOUNTER — TELEPHONE (OUTPATIENT)
Dept: NEUROLOGY | Facility: CLINIC | Age: 43
End: 2023-04-06
Payer: COMMERCIAL

## 2023-04-06 NOTE — TELEPHONE ENCOUNTER
Called [facility], spoke to pharmacist Jamaal and he stated pt has had 6 refills of tizanidine. Last fill was 3/18.

## 2023-04-28 DIAGNOSIS — M62.838 MUSCLE SPASM: ICD-10-CM

## 2023-12-17 DIAGNOSIS — G43.719 INTRACTABLE CHRONIC MIGRAINE WITHOUT AURA AND WITHOUT STATUS MIGRAINOSUS: ICD-10-CM

## 2023-12-18 NOTE — TELEPHONE ENCOUNTER
Rx Authorization:  Requested Medication/ Dose PROPRANOLOL ER 80 MG CAPSULE  Date last refill ordered: 1/11/23  Quantity ordered:   # refills: 9  Date of last clinic visit with ordering provider:   Date of next clinic visit with ordering provider:   All pertinent protocol data (lab date/result):   Include pertinent information from patients message:

## 2023-12-19 RX ORDER — PROPRANOLOL HYDROCHLORIDE 80 MG/1
80 CAPSULE, EXTENDED RELEASE ORAL AT BEDTIME
Qty: 90 CAPSULE | Refills: 3 | Status: SHIPPED | OUTPATIENT
Start: 2023-12-19 | End: 2024-09-17

## 2023-12-21 ENCOUNTER — HOSPITAL ENCOUNTER (EMERGENCY)
Facility: CLINIC | Age: 43
Discharge: HOME | End: 2023-12-22
Attending: EMERGENCY MEDICINE | Admitting: EMERGENCY MEDICINE
Payer: COMMERCIAL
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
## 2023-12-21 DIAGNOSIS — S61.219A LACERATION OF FINGER OF RIGHT HAND WITHOUT FOREIGN BODY WITHOUT DAMAGE TO NAIL, UNSPECIFIED FINGER, INITIAL ENCOUNTER: ICD-10-CM

## 2023-12-21 PROCEDURE — 99284 EMERGENCY DEPT VISIT MOD MDM: CPT | Mod: 25

## 2023-12-21 PROCEDURE — 250N000011 HC RX IP 250 OP 636

## 2023-12-21 PROCEDURE — 250N000013 HC RX MED GY IP 250 OP 250 PS 637: Performed by: EMERGENCY MEDICINE

## 2023-12-21 PROCEDURE — 12001 RPR S/N/AX/GEN/TRNK 2.5CM/<: CPT

## 2023-12-21 RX ORDER — OXYCODONE HYDROCHLORIDE 5 MG/1
5 TABLET ORAL ONCE
Status: COMPLETED | OUTPATIENT
Start: 2023-12-21 | End: 2023-12-21

## 2023-12-21 RX ORDER — BUPIVACAINE HYDROCHLORIDE 5 MG/ML
5 INJECTION, SOLUTION EPIDURAL; INTRACAUDAL ONCE
Status: COMPLETED | OUTPATIENT
Start: 2023-12-21 | End: 2023-12-21

## 2023-12-21 RX ORDER — BUPIVACAINE HYDROCHLORIDE 5 MG/ML
INJECTION, SOLUTION EPIDURAL; INTRACAUDAL
Status: COMPLETED
Start: 2023-12-21 | End: 2023-12-21

## 2023-12-21 RX ADMIN — BUPIVACAINE HYDROCHLORIDE: 5 INJECTION, SOLUTION EPIDURAL; INTRACAUDAL at 23:48

## 2023-12-21 RX ADMIN — OXYCODONE HYDROCHLORIDE 5 MG: 5 TABLET ORAL at 23:47

## 2023-12-21 RX ADMIN — BUPIVACAINE HYDROCHLORIDE: 5 INJECTION, SOLUTION EPIDURAL; INTRACAUDAL; PERINEURAL at 23:48

## 2023-12-22 VITALS
TEMPERATURE: 98.3 F | RESPIRATION RATE: 16 BRPM | DIASTOLIC BLOOD PRESSURE: 84 MMHG | OXYGEN SATURATION: 98 % | SYSTOLIC BLOOD PRESSURE: 134 MMHG | HEART RATE: 81 BPM

## 2023-12-22 PROCEDURE — 250N000011 HC RX IP 250 OP 636: Performed by: EMERGENCY MEDICINE

## 2023-12-22 PROCEDURE — 90471 IMMUNIZATION ADMIN: CPT | Performed by: EMERGENCY MEDICINE

## 2023-12-22 PROCEDURE — 90715 TDAP VACCINE 7 YRS/> IM: CPT | Performed by: EMERGENCY MEDICINE

## 2023-12-22 RX ORDER — CLINDAMYCIN HCL 150 MG
450 CAPSULE ORAL 3 TIMES DAILY
Qty: 45 CAPSULE | Refills: 0 | Status: SHIPPED | OUTPATIENT
Start: 2023-12-22 | End: 2023-12-27

## 2023-12-22 RX ORDER — OXYCODONE HYDROCHLORIDE 5 MG/1
5 TABLET ORAL EVERY 6 HOURS PRN
Qty: 8 TABLET | Refills: 0 | Status: SHIPPED | OUTPATIENT
Start: 2023-12-22 | End: 2023-12-25

## 2023-12-22 RX ORDER — DOXYCYCLINE 100 MG/1
100 CAPSULE ORAL 2 TIMES DAILY
Qty: 10 CAPSULE | Refills: 0 | Status: SHIPPED | OUTPATIENT
Start: 2023-12-22 | End: 2023-12-27

## 2023-12-22 RX ADMIN — CLOSTRIDIUM TETANI TOXOID ANTIGEN (FORMALDEHYDE INACTIVATED), CORYNEBACTERIUM DIPHTHERIAE TOXOID ANTIGEN (FORMALDEHYDE INACTIVATED), BORDETELLA PERTUSSIS TOXOID ANTIGEN (GLUTARALDEHYDE INACTIVATED), BORDETELLA PERTUSSIS FILAMENTOUS HEMAGGLUTININ ANTIGEN (FORMALDEHYDE INACTIVATED), BORDETELLA PERTUSSIS PERTACTIN ANTIGEN, AND BORDETELLA PERTUSSIS FIMBRIAE 2/3 ANTIGEN 0.5 ML: 5; 2; 2.5; 5; 3; 5 INJECTION, SUSPENSION INTRAMUSCULAR at 00:16

## 2023-12-22 NOTE — ED PROVIDER NOTES
History     Chief Complaint:  Dog Bite     HPI   Bhavna Rich is a 43 year old right-handed female with history of hypercholesterolemia who presents to the ED accompanied by her  with a dog bite to her right hand. Patient states that her dogs got into a fight around 2000 tonight and when trying to control the dogs, she was bit in the right fourth finger. She currently endorses a burning and tingling sensation to this finger. The patient's is unsure of her tetanus status. Her dogs are fully vaccinated. Patient adds that she took Clindamycin that she had at home to prevent infection. She reports a known allergy to penicillins.     Independent Historian:   None - Patient Only    Review of External Notes:   none    Medications:   Cymbalta   Emgality   Ativan   Mobic   Remeron  Compazine   Inderal   Seroquel   Nurtec  Imitrex   Zanaflex   Desyrel   Lipitor     Past Medical History:    Depression  Migraines   Sensory neuropathy   Esophageal reflux   Anxiety state   Carpal tunnel syndrome of right wrist   Hypercholesterolemia   Insomnia   Seasonal allergies     Past Surgical History:    Foot surgery   Open appendectomy   Arthrodesis foot, right  section   Tubal ligation   Laparoscopic hysterectomy, supracervical   Procedure for osteomyelitis in right femur    Carpal tunnel release, right     Physical Exam   Patient Vitals for the past 24 hrs:   BP Temp Temp src Pulse Resp SpO2   23 0026 -- -- -- -- -- 98 %   23 0025 -- -- -- -- -- 98 %   23 0024 134/84 -- -- 81 -- --   23 2125 (!) 138/91 98.3  F (36.8  C) Temporal 93 16 100 %        Physical Exam  General: Appears stated age  Head:  The scalp, face, and head appear normal  Eyes:  The pupils are normal    Conjunctivae and sclera appear normal  ENT:    The nose is normal  Neck:  Normal range of motion  Skin:  No rash or lesions noted.  Neuro:  Speech is normal and fluent  Psych: Awake. Alert.  Normal affect.    ARELY:  VONDA  Hand:    2.5 cm partial thickness laceration to palmar aspect of 4th digit extending over PIP and DIP joint, no bony/obvious tendon/arterial injury. Minimal bony tenderness. No FB visualized. Slight ecchymosis to dorsal hand at 4th/5th metacarpal  The finger flexors (FDS/FDP) are intact    The finger extensors are intact    The thumb exam is normal, including:    Adduction, abduction, flexion, extension, opposition    There are no sensory deficits    Median, Ulnar, and Radial nerve function is normal    Radial artery pulsations are normal    Capillary refill is normal            Emergency Department Course     Procedures     Laceration Repair      Procedure: Laceration Repair    Indication: Laceration    Consent: Verbal    Location: R fourth (ring) finger    Length: 2.5 cm    Preparation: Irrigation with Sterile Saline.    Anesthesia/Sedation: 1% bupivacaine       Treatment/Exploration: Wound explored, no foreign bodies found     Closure: The wound was closed with one layer. Skin/superficial layer was closed with 4 x 4-0 Nylon using Interrupted sutures.     Patient Status: The patient tolerated the procedure well: Yes. There were no complications.    Emergency Department Course & Assessments:       Interventions:  Medications   oxyCODONE (ROXICODONE) tablet 5 mg (5 mg Oral $Given 12/21/23 2347)   BUPivacaine (MARCAINE) 0.5% preservative free injection ( Intradermal $Given by Other 12/21/23 2348)   Tdap (tetanus-diphtheria-acell pertussis) (ADACEL) injection 0.5 mL (0.5 mLs Intramuscular $Given 12/22/23 0016)        Assessments:  2333 I obtained history and examined the patient as noted above.  0000 Performed laceration repair, as noted above.     Social Determinants of Health affecting care:   None    Disposition:  The patient was discharged to home.     Impression & Plan    CMS Diagnoses: None    Medical Decision Making:  Bhavna Rich is a 43 year old female who presents for evaluation of a dog bite to Zhang  4th finger  The workup here in the ED shows no signs of compartment syndrome, significant lacerations, obvious tendon or bone injury.  No signs of foreign body or dog teeth in wound.  Dog is known so will have patient/family observe for signs of rabies; no rabies shots indicated from ED at this time. The wounds were washed out with high pressure irrigation.  Laceration loosely approximated given extent of injury.  Encouraged bacitracin application and will initiate doxycyline and clindamycin in the outpatient setting. Oxycodone for breakthrough pain; tylenol/motrin encouraged.  Plan for close outpatient ortho hand follow-up for eval and suture removal. Reviewed risk of scarring/infection and to monitor for fever, increasing redness, purulent drainage or should symptoms worsen to promptly represent.         Diagnosis:    ICD-10-CM    1. Laceration of finger of right hand without foreign body without damage to nail, unspecified finger, initial encounter  S61.219A       2. Dog bite, initial encounter  W54.0XXA         Discharge Medications:   Clindamycin (Cleocin) 150 MG capsule   Take 3 capsules (450 mg) by mouth 3 times daily for 5 days     Doxycyline hyclate (Vibramycin) 100 MG capsule   Take 1 capsule (100 mg) by mouth 2 times daily for 5 days     Oxycodone (Roxicodone) 5 MG tablet   Take 1 tablet (5 mg) by mouth every 6 hours as needed for pain    Scribe Disclosure:  Denise CANTRELL, am serving as a scribe at 12:15 AM on 12/22/2023 to document services personally performed by Dena Calderon DO based on my observations and the provider's statements to me.   12/21/2023   Dena Calderon DO McDonald, Lindsey E, DO  12/22/23 1880

## 2023-12-22 NOTE — ED TRIAGE NOTES
"Patient reports dog bite to right ring finger this evening.  She reports the dog was her own, UTD on vaccines.  Bleeding controlled upon arrival.  ABCs intact, A&Ox4.    Patient reports she took her own home clindamycin because \"she didn't want an infection\"     Triage Assessment (Adult)       Row Name 12/21/23 6878          Triage Assessment    Airway WDL WDL        Respiratory WDL    Respiratory WDL WDL        Skin Circulation/Temperature WDL    Skin Circulation/Temperature WDL WDL        Cardiac WDL    Cardiac WDL WDL        Peripheral/Neurovascular WDL    Peripheral Neurovascular WDL WDL        Cognitive/Neuro/Behavioral WDL    Cognitive/Neuro/Behavioral WDL WDL                     "

## 2024-01-03 ENCOUNTER — VIRTUAL VISIT (OUTPATIENT)
Dept: NEUROLOGY | Facility: CLINIC | Age: 44
End: 2024-01-03
Payer: COMMERCIAL

## 2024-01-03 VITALS — HEIGHT: 64 IN | BODY MASS INDEX: 29.02 KG/M2 | WEIGHT: 170 LBS

## 2024-01-03 DIAGNOSIS — G43.009 MIGRAINE WITHOUT AURA AND WITHOUT STATUS MIGRAINOSUS, NOT INTRACTABLE: ICD-10-CM

## 2024-01-03 DIAGNOSIS — M62.838 MUSCLE SPASM: ICD-10-CM

## 2024-01-03 DIAGNOSIS — G43.719 INTRACTABLE CHRONIC MIGRAINE WITHOUT AURA AND WITHOUT STATUS MIGRAINOSUS: ICD-10-CM

## 2024-01-03 DIAGNOSIS — Z79.899 ENCOUNTER FOR LONG-TERM (CURRENT) USE OF MEDICATIONS: Primary | ICD-10-CM

## 2024-01-03 PROCEDURE — 99214 OFFICE O/P EST MOD 30 MIN: CPT | Mod: 95 | Performed by: NURSE PRACTITIONER

## 2024-01-03 RX ORDER — ATORVASTATIN CALCIUM 40 MG/1
40 TABLET, FILM COATED ORAL AT BEDTIME
COMMUNITY

## 2024-01-03 RX ORDER — PROCHLORPERAZINE MALEATE 10 MG
10 TABLET ORAL EVERY 6 HOURS PRN
Qty: 20 TABLET | Refills: 3 | Status: SHIPPED | OUTPATIENT
Start: 2024-01-03 | End: 2024-09-17

## 2024-01-03 RX ORDER — SUMATRIPTAN 100 MG/1
TABLET, FILM COATED ORAL
Qty: 12 TABLET | Refills: 9 | Status: SHIPPED | OUTPATIENT
Start: 2024-01-03 | End: 2024-09-17

## 2024-01-03 ASSESSMENT — MIGRAINE DISABILITY ASSESSMENT (MIDAS)
HOW OFTEN WERE SOCIAL ACTIVITIES MISSED DUE TO HEADACHES: 0
HOW MANY DAYS DID YOU NOT DO HOUSEWORK BECAUSE OF HEADACHES: 0
HOW MANY DAYS IN THE PAST 3 MONTHS HAVE YOU HAD A HEADACHE: 3
TOTAL SCORE: 0
HOW MANY DAYS WAS HOUSEWORK PRODUCTIVITY CUT IN HALF DUE TO HEADACHES: 0
HOW MANY DAYS DID YOU MISS WORK OR SCHOOL BECAUSE OF HEADACHES: 0
ON A SCALE FROM 0-10 ON AVERAGE HOW PAINFUL WERE HEADACHES: 5
HOW MANY DAYS WAS YOUR PRODUCTIVITY CUT IN HALF BECAUSE OF HEADACHES: 0

## 2024-01-03 ASSESSMENT — HEADACHE IMPACT TEST (HIT 6)
HOW OFTEN DID HEADACHS LIMIT CONCENTRATION ON WORK OR DAILY ACTIVITY: NEVER
WHEN YOU HAVE A HEADACHE HOW OFTEN DO YOU WISH YOU COULD LIE DOWN: RARELY
HOW OFTEN HAVE YOU FELT TOO TIRED TO WORK BECAUSE OF YOUR HEADACHES: SOMETIMES
WHEN YOU HAVE HEADACHES HOW OFTEN IS THE PAIN SEVERE: RARELY
HIT6 TOTAL SCORE: 46
HOW OFTEN HAVE YOU FELT FED UP OR IRRITATED BECAUSE OF YOUR HEADACHES: NEVER
HOW OFTEN DO HEADACHES LIMIT YOUR DAILY ACTIVITIES: RARELY

## 2024-01-03 ASSESSMENT — PAIN SCALES - GENERAL: PAINLEVEL: NO PAIN (0)

## 2024-01-03 NOTE — PROGRESS NOTES
Bhavna is a 43 year old who is being evaluated via a billable video visit.      Subjective   Bhavna is a 43 year old, presenting for the following health issues: headache   Follow Up    Stopped Emgality due to cost 2 months ago. Over $1300 per one injection and a lot of problems with pharmacy feeling it. Emgality was effective. Patient at [facility] for medical coding.   May be around menstrual cycle (had partial hysterectomy-still has ovaries) headaches and has been doing well. Duration of headache 2-3 days and once every 3 month.   Ajovy for a couple months and it has been effective but stopped because of cost.   Nurtec-did not try it - was  covered but Expected CoPay: $958.76       Sumatriptan oral has been helping and takes it once last month   Prochlorperazine-does not recall taking it-did not use it in a while   propranolol -80 mg ER at bedtime   Duloxetine 90 mg daily and prescribed by psychiatry  Tizanidine up to 16 mg at night and no side effects but causes drowsiness and cannot tell whether effective but does not want to change anything.     Plan:  Continue propranolol 80- mg ER   Tizanidine 16 mg at bedtime -no side effects-liver panel for side effects monitoring. Liver side effects reviewed with the patient.   Rescue treatment   Sumatriptan as needed. Limit use to no more than 9 days per month.   Naproxen 2 tab every 12 hours+prochlorperazine+benedryl as needed for nausea/vomiting/severe migraine in addition to sumatriptan as needed   Monitor headaches and if recurring -might consider Botox treatment   Follow up in 3-6 months or sooner if needed     Objective    Vitals - Patient Reported  Pain Score: No Pain (0)    Physical Exam   Patient is alert and no in apparent acute distress,  mentation appears normal, judgement and insight intact, normal speech, no weakness observed    I discussed all my recommendations with Bhavna Rich who verbalizes understanding and comfortable with the plan.       31 minutes spent on the date of the encounter doing video access, chart  review, meds review, treatment plan, documentation and further activities as noted above    RAVI Mckeon, CNP [facility]  Headache certified  [facility]      Video-Visit Details    Type of service:  Video Visit   Originating Location (pt. Location): Home  Distant Location (provider location):  Off-site  Platform used for Video Visit: Field Agent

## 2024-01-03 NOTE — LETTER
1/3/2024       RE: Bhavna Rich  [address] [facility] [address]     Dear Colleague,    Thank you for referring your patient, Bhavna Rich, to the [facility]UROLOG[facility]IC [place] at [facility]. Please see a copy of my visit note below.    Bhavna is a 43 year old who is being evaluated via a billable video visit.      Subjective  Bhavna is a 43 year old, presenting for the following health issues: headache   Follow Up    Stopped Emgality due to cost 2 months ago. Over $1300 per one injection and a lot of problems with pharmacy feeling it. Emgality was effective. Patient at [facility] for medical coding.   May be around menstrual cycle (had partial hysterectomy-still has ovaries) headaches and has been doing well. Duration of headache 2-3 days and once every 3 month.   Ajovy for a couple months and it has been effective but stopped because of cost.   Nurtec-did not try it - was  covered but Expected CoPay: $958.76       Sumatriptan oral has been helping and takes it once last month   Prochlorperazine-does not recall taking it-did not use it in a while   propranolol -80 mg ER at bedtime   Duloxetine 90 mg daily and prescribed by psychiatry  Tizanidine up to 16 mg at night and no side effects but causes drowsiness and cannot tell whether effective but does not want to change anything.     Plan:  Continue propranolol 80- mg ER   Tizanidine 16 mg at bedtime -no side effects-liver panel for side effects monitoring. Liver side effects reviewed with the patient.   Rescue treatment   Sumatriptan as needed. Limit use to no more than 9 days per month.   Naproxen 2 tab every 12 hours+prochlorperazine+benedryl as needed for nausea/vomiting/severe migraine in addition to sumatriptan as needed   Monitor headaches and if recurring -might consider Botox treatment   Follow up in 3-6 months or sooner if needed     Objective   Vitals - Patient  Reported  Pain Score: No Pain (0)    Physical Exam   Patient is alert and no in apparent acute distress,  mentation appears normal, judgement and insight intact, normal speech, no weakness observed    I discussed all my recommendations with Bhavna Rich who verbalizes understanding and comfortable with the plan.      31 minutes spent on the date of the encounter doing video access, chart  review, meds review, treatment plan, documentation and further activities as noted above        Again, thank you for allowing me to participate in the care of your patient.      Sincerely,    RAVI Rodriguez CNP

## 2024-01-03 NOTE — NURSING NOTE
Is the patient currently in the state of [place]? YES    Visit mode:VIDEO    If the visit is dropped, the patient can be reconnected by: VIDEO VISIT: Text to cell phone:   Telephone Information:   Mobile [phone]       Will anyone else be joining the visit? NO  (If patient encounters technical issues they should call [phone]    How would you like to obtain your AVS? MyChart    Are changes needed to the allergy or medication list? No    Reason for visit: Follow Up    Tere CONKLIN

## 2024-03-04 DIAGNOSIS — G43.719 INTRACTABLE CHRONIC MIGRAINE WITHOUT AURA AND WITHOUT STATUS MIGRAINOSUS: Primary | ICD-10-CM

## 2024-03-18 ENCOUNTER — TELEPHONE (OUTPATIENT)
Dept: NEUROLOGY | Facility: CLINIC | Age: 44
End: 2024-03-18

## 2024-03-18 ENCOUNTER — OFFICE VISIT (OUTPATIENT)
Dept: NEUROLOGY | Facility: CLINIC | Age: 44
End: 2024-03-18
Payer: COMMERCIAL

## 2024-03-18 DIAGNOSIS — G43.009 MIGRAINE WITHOUT AURA AND WITHOUT STATUS MIGRAINOSUS, NOT INTRACTABLE: Primary | ICD-10-CM

## 2024-03-18 DIAGNOSIS — G43.719 INTRACTABLE CHRONIC MIGRAINE WITHOUT AURA AND WITHOUT STATUS MIGRAINOSUS: ICD-10-CM

## 2024-03-18 PROCEDURE — 64615 CHEMODENERV MUSC MIGRAINE: CPT | Performed by: NURSE PRACTITIONER

## 2024-03-18 RX ORDER — METHYLPREDNISOLONE 4 MG
TABLET, DOSE PACK ORAL
Qty: 21 TABLET | Refills: 0 | Status: SHIPPED | OUTPATIENT
Start: 2024-03-18 | End: 2024-09-17

## 2024-03-18 NOTE — PROGRESS NOTES
"PROCEDURE NOTE:    [facility]  Botulinum Toxin Procedure    Headache Neurology    March 18, 2024    Procedure:  OnabotulinumtoxinA injections for chronic migraine  Indication:  Chronic migraine    Bhavna suffers from severe intractable headaches.  She was referred by for onabotulinumtoxinA injections for headache.  Risks, benefits, and alternatives were discussed.  All questions were answered and consent given.  She decided to proceed with the injections.     Headache history, from initial consult note: \"Headache history-Headaches with pregnancies. Onset of migraine headaches with starting a menstrual cycle. Migraine headaches have increased in the last couple of months. Patient went to the [facility].   Headaches  stem from the back of the head and usually right side and behind right eye. Pulsing and searing pain and starts to get nausea, light and noise sensitivity. Being in the dark and quiet room usually helps.  Triggers-noise, food, not enough sleep,   History of partial hysterectomy   Headache Frequency -10-15 days per month and duration all day  \".    Prior to initiation of botulinum toxin injections, Ms. Rich reported 30 headache days per month, with 20 severe headache days per month. Her headaches are quite disabling and often interfere with her ability to function normally.    Date of last injections: today is an initial injections    Ms. Rich reports 10-15 headache days per month currently, with 10-15 severe headache days per month.  She has noticed a wearing off phenomenon prior to this round of botulinum toxin injections, lasting 0 weeks.    Botulinum toxin injections have improved their functioning. Today is initial Botox injections    She has attempted other migraine prophylactic treatments in the past, which have included:   Stopped Emgality due to cost 2 months ago. Over $1300 per one injection and a lot of problems with pharmacy feeling it. Emgality was effective. "   Ajovy for a couple months and it has been effective but stopped because of cost.   Nurtec-did not try it - was  covered but Expected CoPay: $958.76       Sumatriptan oral has been helping and takes it once last month   Prochlorperazine-does not recall taking it-did not use it in a while   propranolol -80 mg ER at bedtime   Duloxetine 90 mg daily and prescribed by psychiatry  Tizanidine up to 16 mg at night and no side effects but causes drowsiness and cannot tell whether effective but does not want to change anything.    propranolol 80- mg ER   Sumatriptan  Naproxen   Occipital nerve blocks twice at [facility] but did not work mid-end March  Flexeril -did not help  Duloxetine 90 mg for depression for a couple of years and helps with depression  Gabapentin 100 mg TID per psychiatry and was increased 200 mg TID and caused side effects-stuttering and stopped gabapentin  lorazapam per psychiatry for panic attacks but it does not help  Medrol pack for headaches and did not help and no side effects  Mirtazapine and trazadone as needed for insomnia  Prochlorperazine 10 mg -tried once did help before but not last Saturday  Quetiapine 75 mg am and at bedtime  Rizatriptan tried twice and did not help  Sumatriptan -stopped working    Ms. Nys pain was assessed prior to the procedure.  She rated her pain today as 8 out of 10.    Procedural Pause: Procedural pause was conducted to verify correct patient identity, procedure to be performed, correct side and site, correct patient position, and special requirements. Appropriate hand hygiene was utilized, and each injection site was prepped with alcohol wipe or Chloraprep swab.     Procedure Details: 200 units of onabotulinumtoxinA was diluted in 4 mL 0.9% normal saline. A total of 155 units of onabotulinumtoxinA were injected using 30 gauge 0.5 in needles into the muscles listed below. 45 units of onabotulinumtoxinA were wasted.     GOAL OF PROCEDURE:  The goal of this procedure  is to decrease pain and enhance functional independence.    CONSENT:  The risks, benefits, and treatment options were discussed with the patient who agreed to proceed.    Written consent was obtained     EQUIPMENT USED:  Needles-30 gauge, 0.5 inches for injections  Four 1-ml tuberculin syringes for injections  One sodium chloride 10 ml vial preservative free  Alcohol swabs    SKIN PREPARATION:  Skin preparation was performed using an alcohol wipe.      AREA/MUSCLE INJECTED:  155 units of Botox    Right upper Trapezius (upper cervical) - 5 units of Botox at 3 site/s.   Left upper Trapezius (upper cervical) - 5 units of Botox at 3 site/s.     Right cervical paraspinals - 5 units of Botox at 2 site/s.   Left cervical paraspinals - 5 units of Botox at 2 site/s.     Left occipitalis - 5 units of Botox at 3 site/s.  Right occipitalis -5 units of Botox at 3 site/s    Right Frontalis - 5 units of Botox at 2 site/s.  Left Frontalis - 5 units of Botox at 2 site/s.    Right Temporalis - 5 units of Botox at 4 site/s.  Left Temporalis - 5 units of Botox at 4 site/s.    Right  - 5 units of Botox at 1 site/s.              Left  - 5 units of Botox at 1 site/s.    Procerus - 5 units of Botox at 1 site/s.    RESPONSE TO PROCEDURE:  tolerated the procedure well and there were no immediate complications.  Patient was allowed to recover for an appropriate period of time and was discharged home in stable condition.    FOLLOW UP:  Ubrelvy as needed for rescue   Medrol dose pack to break migraine cycle-side effects reviewed. Patient will stop sumatriptan and NSAIDs  Repeat Botox injections in 12 weeks      This procedure was performed under a hospital privileging agreement with Dr. Reymundo Cortes, APRN, [facility]

## 2024-03-18 NOTE — LETTER
"3/18/2024       RE: Bhavna Rich  [address] [facility] [address]       Dear Colleague,    Thank you for referring your patient, Bhavna Rich, to the [facility] NEUROLOGY CLINIC [place] at [facility]. Please see a copy of my visit note below.    PROCEDURE NOTE:    [facility]  Botulinum Toxin Procedure    Headache Neurology    March 18, 2024    Procedure:  OnabotulinumtoxinA injections for chronic migraine  Indication:  Chronic migraine    Bhavna suffers from severe intractable headaches.  She was referred by for onabotulinumtoxinA injections for headache.  Risks, benefits, and alternatives were discussed.  All questions were answered and consent given.  She decided to proceed with the injections.     Headache history, from initial consult note: \"Headache history-Headaches with pregnancies. Onset of migraine headaches with starting a menstrual cycle. Migraine headaches have increased in the last couple of months. Patient went to the [facility].   Headaches  stem from the back of the head and usually right side and behind right eye. Pulsing and searing pain and starts to get nausea, light and noise sensitivity. Being in the dark and quiet room usually helps.  Triggers-noise, food, not enough sleep,   History of partial hysterectomy   Headache Frequency -10-15 days per month and duration all day  \".    Prior to initiation of botulinum toxin injections, Ms. Rich reported 30 headache days per month, with 20 severe headache days per month. Her headaches are quite disabling and often interfere with her ability to function normally.    Date of last injections: today is an initial injections    Ms. Rich reports 10-15 headache days per month currently, with 10-15 severe headache days per month.  She has noticed a wearing off phenomenon prior to this round of botulinum toxin injections, lasting 0 weeks.    Botulinum " toxin injections have improved their functioning. Today is initial Botox injections    She has attempted other migraine prophylactic treatments in the past, which have included:   Stopped Emgality due to cost 2 months ago. Over $1300 per one injection and a lot of problems with pharmacy feeling it. Emgality was effective.   Ajovy for a couple months and it has been effective but stopped because of cost.   Nurtec-did not try it - was  covered but Expected CoPay: $958.76       Sumatriptan oral has been helping and takes it once last month   Prochlorperazine-does not recall taking it-did not use it in a while   propranolol -80 mg ER at bedtime   Duloxetine 90 mg daily and prescribed by psychiatry  Tizanidine up to 16 mg at night and no side effects but causes drowsiness and cannot tell whether effective but does not want to change anything.    propranolol 80- mg ER   Sumatriptan  Naproxen   Occipital nerve blocks twice at [facility] but did not work mid-end March  Flexeril -did not help  Duloxetine 90 mg for depression for a couple of years and helps with depression  Gabapentin 100 mg TID per psychiatry and was increased 200 mg TID and caused side effects-stuttering and stopped gabapentin  lorazapam per psychiatry for panic attacks but it does not help  Medrol pack for headaches and did not help and no side effects  Mirtazapine and trazadone as needed for insomnia  Prochlorperazine 10 mg -tried once did help before but not last Saturday  Quetiapine 75 mg am and at bedtime  Rizatriptan tried twice and did not help  Sumatriptan -stopped working    Ms. Mccoy pain was assessed prior to the procedure.  She rated her pain today as 8 out of 10.    Procedural Pause: Procedural pause was conducted to verify correct patient identity, procedure to be performed, correct side and site, correct patient position, and special requirements. Appropriate hand hygiene was utilized, and each injection site was prepped with alcohol wipe or  Chloraprep swab.     Procedure Details: 200 units of onabotulinumtoxinA was diluted in 4 mL 0.9% normal saline. A total of 155 units of onabotulinumtoxinA were injected using 30 gauge 0.5 in needles into the muscles listed below. 45 units of onabotulinumtoxinA were wasted.     GOAL OF PROCEDURE:  The goal of this procedure is to decrease pain and enhance functional independence.    CONSENT:  The risks, benefits, and treatment options were discussed with the patient who agreed to proceed.    Written consent was obtained     EQUIPMENT USED:  Needles-30 gauge, 0.5 inches for injections  Four 1-ml tuberculin syringes for injections  One sodium chloride 10 ml vial preservative free  Alcohol swabs    SKIN PREPARATION:  Skin preparation was performed using an alcohol wipe.      AREA/MUSCLE INJECTED:  155 units of Botox    Right upper Trapezius (upper cervical) - 5 units of Botox at 3 site/s.   Left upper Trapezius (upper cervical) - 5 units of Botox at 3 site/s.     Right cervical paraspinals - 5 units of Botox at 2 site/s.   Left cervical paraspinals - 5 units of Botox at 2 site/s.     Left occipitalis - 5 units of Botox at 3 site/s.  Right occipitalis -5 units of Botox at 3 site/s    Right Frontalis - 5 units of Botox at 2 site/s.  Left Frontalis - 5 units of Botox at 2 site/s.    Right Temporalis - 5 units of Botox at 4 site/s.  Left Temporalis - 5 units of Botox at 4 site/s.    Right  - 5 units of Botox at 1 site/s.              Left  - 5 units of Botox at 1 site/s.    Procerus - 5 units of Botox at 1 site/s.    RESPONSE TO PROCEDURE:  tolerated the procedure well and there were no immediate complications.  Patient was allowed to recover for an appropriate period of time and was discharged home in stable condition.    FOLLOW UP:  Ubrelvy as needed for rescue   Medrol dose pack to break migraine cycle-side effects reviewed. Patient will stop sumatriptan and NSAIDs  Repeat Botox injections in 12  weeks      This procedure was performed under a hospital privileging agreement with Dr. Dwyer          Again, thank you for allowing me to participate in the care of your patient.      Sincerely,    RAVI Rodriguez CNP

## 2024-03-29 ENCOUNTER — TELEPHONE (OUTPATIENT)
Dept: NEUROLOGY | Facility: CLINIC | Age: 44
End: 2024-03-29
Payer: COMMERCIAL

## 2024-03-29 NOTE — TELEPHONE ENCOUNTER
Prior Authorization Retail Medication Request     Medication/Dose: Nurtec 75 mg  ICD code (if different than what is on RX):  G43.009  Previously Tried and Failed:     Propranolol stopped  Occipital nerve blocks twice at [facility] but did not work mid-end March  Flexeril -did not help  Duloxetine 90 mg for depression for a couple of years and helps with depression  Gabapentin 100 mg TID per psychiatry and was increased 200 mg TID and caused side effects-stuttering and stopped gabapentin  lorazapam per psychiatry for panic attacks but it does not help  Medrol pack for headaches and did not help and no side effects  Mirtazapine and trazadone as needed for insomnia  Prochlorperazine 10 mg -tried once did help before but not last Saturday  Quetiapine 75 mg am and at bedtime  Rizatriptan tried twice and did not help  Sumatriptan -stopped working   Ubrelvy - not covered    Rationale:  migraine     Insurance Name:  [employer]  Insurance ID:  [id number]      Pharmacy Information (if different than what is on RX)  Name:    Phone:

## 2024-04-10 NOTE — TELEPHONE ENCOUNTER
PA Initiation    Medication: RIMEGEPANT SULFATE 75 MG PO TBDP  Insurance Company: [employer] - Phone [phone] Fax [phone]  Pharmacy Filling the Rx: [id number] - [place], [place] - [address] [address]  Filling Pharmacy Phone: [phone]  Filling Pharmacy Fax: [phone]  Start Date: 4/10/2024

## 2024-04-12 NOTE — TELEPHONE ENCOUNTER
Spoke with plan, they confirmed receipt of request. Case is still under review. Plan has until 04/15 to make determination.

## 2024-04-16 NOTE — TELEPHONE ENCOUNTER
Prior Authorization Approval    Medication: RIMEGEPANT SULFATE 75 MG PO TBDP  Authorization Effective Date: 3/11/2024  Authorization Expiration Date: 4/10/2025  Approved Dose/Quantity: 8/30  Reference #: [id number]   Insurance Company: [employer] - Phone [phone] Fax [phone]  Which Pharmacy is filling the prescription: [id number] - [place], [place] - [address]  [address]   Pharmacy Notified: y  Patient Notified: y - pharmacy to notify

## 2024-06-02 ENCOUNTER — HEALTH MAINTENANCE LETTER (OUTPATIENT)
Age: 44
End: 2024-06-02

## 2024-06-23 ASSESSMENT — HEADACHE IMPACT TEST (HIT 6)
HIT6 TOTAL SCORE: 47
HOW OFTEN DO HEADACHES LIMIT YOUR DAILY ACTIVITIES: RARELY
HOW OFTEN DID HEADACHS LIMIT CONCENTRATION ON WORK OR DAILY ACTIVITY: NEVER
HOW OFTEN HAVE YOU FELT FED UP OR IRRITATED BECAUSE OF YOUR HEADACHES: NEVER
WHEN YOU HAVE A HEADACHE HOW OFTEN DO YOU WISH YOU COULD LIE DOWN: ALWAYS
WHEN YOU HAVE HEADACHES HOW OFTEN IS THE PAIN SEVERE: RARELY
HOW OFTEN HAVE YOU FELT TOO TIRED TO WORK BECAUSE OF YOUR HEADACHES: NEVER

## 2024-06-24 ENCOUNTER — OFFICE VISIT (OUTPATIENT)
Dept: NEUROLOGY | Facility: CLINIC | Age: 44
End: 2024-06-24
Payer: COMMERCIAL

## 2024-06-24 DIAGNOSIS — G43.719 INTRACTABLE CHRONIC MIGRAINE WITHOUT AURA AND WITHOUT STATUS MIGRAINOSUS: Primary | ICD-10-CM

## 2024-06-24 PROCEDURE — 64615 CHEMODENERV MUSC MIGRAINE: CPT | Performed by: NURSE PRACTITIONER

## 2024-06-24 NOTE — PROGRESS NOTES
"PROCEDURE NOTE:     [facility]  Botulinum Toxin Procedure     Headache Neurology     06/24/24       Procedure:  OnabotulinumtoxinA injections for chronic migraine  Indication:  Chronic migraine     Bhavna suffers from severe intractable headaches.  She was referred by for onabotulinumtoxinA injections for headache.  Risks, benefits, and alternatives were discussed.  All questions were answered and consent given.  She decided to proceed with the injections.      Headache history, from initial consult note: \"Headache history-Headaches with pregnancies. Onset of migraine headaches with starting a menstrual cycle. Migraine headaches have increased in the last couple of months. Patient went to the [facility].   Headaches  stem from the back of the head and usually right side and behind right eye. Pulsing and searing pain and starts to get nausea, light and noise sensitivity. Being in the dark and quiet room usually helps.  Triggers-noise, food, not enough sleep,   History of partial hysterectomy   Headache Frequency -10-15 days per month and duration all day  \".     Prior to initiation of botulinum toxin injections, Ms. Rich reported 30 headache days per month, with 20 severe headache days per month. Her headaches are quite disabling and often interfere with her ability to function normally.     Date of last injections:  March 18, 2024  Ms. Rich reports 3-4 headache days per month currently, with 3-4 severe headache days per month.  She has noticed a wearing off phenomenon prior to this round of botulinum toxin injections, lasting 0 weeks.     Botulinum toxin injections have improved their functioning.     She has attempted other migraine prophylactic treatments in the past, which have included:   Stopped Emgality due to cost 2 months ago. Over $1300 per one injection and a lot of problems with pharmacy feeling it. Emgality was effective.   Ajovy for a couple months and it has been " effective but stopped because of cost.   Nurtec-did not try it - was  covered but Expected CoPay: $958.76       Sumatriptan oral has been helping and takes it once last month   Prochlorperazine-does not recall taking it-did not use it in a while   propranolol -80 mg ER at bedtime   Duloxetine 90 mg daily and prescribed by psychiatry  Tizanidine up to 16 mg at night and no side effects but causes drowsiness and cannot tell whether effective but does not want to change anything.    propranolol 80- mg ER   Sumatriptan  Naproxen   Occipital nerve blocks twice at [facility] but did not work mid-end March  Flexeril -did not help  Duloxetine 90 mg for depression for a couple of years and helps with depression  Gabapentin 100 mg TID per psychiatry and was increased 200 mg TID and caused side effects-stuttering and stopped gabapentin  lorazapam per psychiatry for panic attacks but it does not help  Medrol pack for headaches and did not help and no side effects  Mirtazapine and trazadone as needed for insomnia  Prochlorperazine 10 mg -tried once did help before but not last Saturday  Quetiapine 75 mg am and at bedtime  Rizatriptan tried twice and did not help  Sumatriptan -stopped working     Ms. Mccoy pain was assessed prior to the procedure.  She rated her pain today as 3 out of 10.     Procedural Pause: Procedural pause was conducted to verify correct patient identity, procedure to be performed, correct side and site, correct patient position, and special requirements. Appropriate hand hygiene was utilized, and each injection site was prepped with alcohol wipe or Chloraprep swab.      Procedure Details: 200 units of onabotulinumtoxinA was diluted in 4 mL 0.9% normal saline. A total of 155 units of onabotulinumtoxinA were injected using 30 gauge 0.5 in needles into the muscles listed below. 45 units of onabotulinumtoxinA were wasted.      GOAL OF PROCEDURE:  The goal of this procedure is to decrease pain and enhance  functional independence.     CONSENT:  The risks, benefits, and treatment options were discussed with the patient who agreed to proceed.     Written consent was obtained      EQUIPMENT USED:  Needles-30 gauge, 0.5 inches for injections  Four 1-ml tuberculin syringes for injections  One sodium chloride 10 ml vial preservative free  Alcohol swabs     SKIN PREPARATION:  Skin preparation was performed using an alcohol wipe.        AREA/MUSCLE INJECTED:  155 units of Botox     Right upper Trapezius (upper cervical) - 5 units of Botox at 3 site/s.   Left upper Trapezius (upper cervical) - 5 units of Botox at 3 site/s.      Right cervical paraspinals - 5 units of Botox at 2 site/s.   Left cervical paraspinals - 5 units of Botox at 2 site/s.      Left occipitalis - 5 units of Botox at 3 site/s.  Right occipitalis -5 units of Botox at 3 site/s     Right Frontalis - 5 units of Botox at 2 site/s.  Left Frontalis - 5 units of Botox at 2 site/s.     Right Temporalis - 5 units of Botox at 4 site/s.  Left Temporalis - 5 units of Botox at 4 site/s.     Right  - 5 units of Botox at 1 site/s.              Left  - 5 units of Botox at 1 site/s.     Procerus - 5 units of Botox at 1 site/s.     RESPONSE TO PROCEDURE:  tolerated the procedure well and there were no immediate complications.  Patient was allowed to recover for an appropriate period of time and was discharged home in stable condition.     FOLLOW UP:  Repeat Botox injections in 12 weeks        This procedure was performed under a hospital privileging agreement with RAVI Castañeda, [facility]

## 2024-06-24 NOTE — LETTER
"6/24/2024       RE: Bhavna Rich  [id number] [facility] [address]     Dear Colleague,    Thank you for referring your patient, Bhavna Rich, to the [facility] [place] at [facility]. Please see a copy of my visit note below.    PROCEDURE NOTE:     [facility]  Botulinum Toxin Procedure     Headache Neurology     06/24/24       Procedure:  OnabotulinumtoxinA injections for chronic migraine  Indication:  Chronic migraine     Bhavna suffers from severe intractable headaches.  She was referred by for onabotulinumtoxinA injections for headache.  Risks, benefits, and alternatives were discussed.  All questions were answered and consent given.  She decided to proceed with the injections.      Headache history, from initial consult note: \"Headache history-Headaches with pregnancies. Onset of migraine headaches with starting a menstrual cycle. Migraine headaches have increased in the last couple of months. Patient went to the [facility].   Headaches  stem from the back of the head and usually right side and behind right eye. Pulsing and searing pain and starts to get nausea, light and noise sensitivity. Being in the dark and quiet room usually helps.  Triggers-noise, food, not enough sleep,   History of partial hysterectomy   Headache Frequency -10-15 days per month and duration all day  \".     Prior to initiation of botulinum toxin injections, Ms. Rich reported 30 headache days per month, with 20 severe headache days per month. Her headaches are quite disabling and often interfere with her ability to function normally.     Date of last injections:  March 18, 2024  Ms. Rich reports 3-4 headache days per month currently, with 3-4 severe headache days per month.  She has noticed a wearing off phenomenon prior to this round of botulinum toxin injections, lasting 0 weeks.     Botulinum toxin injections have " improved their functioning.     She has attempted other migraine prophylactic treatments in the past, which have included:   Stopped Emgality due to cost 2 months ago. Over $1300 per one injection and a lot of problems with pharmacy feeling it. Emgality was effective.   Ajovy for a couple months and it has been effective but stopped because of cost.   Nurtec-did not try it - was  covered but Expected CoPay: $958.76       Sumatriptan oral has been helping and takes it once last month   Prochlorperazine-does not recall taking it-did not use it in a while   propranolol -80 mg ER at bedtime   Duloxetine 90 mg daily and prescribed by psychiatry  Tizanidine up to 16 mg at night and no side effects but causes drowsiness and cannot tell whether effective but does not want to change anything.    propranolol 80- mg ER   Sumatriptan  Naproxen   Occipital nerve blocks twice at [facility] but did not work mid-end March  Flexeril -did not help  Duloxetine 90 mg for depression for a couple of years and helps with depression  Gabapentin 100 mg TID per psychiatry and was increased 200 mg TID and caused side effects-stuttering and stopped gabapentin  lorazapam per psychiatry for panic attacks but it does not help  Medrol pack for headaches and did not help and no side effects  Mirtazapine and trazadone as needed for insomnia  Prochlorperazine 10 mg -tried once did help before but not last Saturday  Quetiapine 75 mg am and at bedtime  Rizatriptan tried twice and did not help  Sumatriptan -stopped working     Ms. Nys pain was assessed prior to the procedure.  She rated her pain today as 3 out of 10.     Procedural Pause: Procedural pause was conducted to verify correct patient identity, procedure to be performed, correct side and site, correct patient position, and special requirements. Appropriate hand hygiene was utilized, and each injection site was prepped with alcohol wipe or Chloraprep swab.      Procedure Details: 200 units of  onabotulinumtoxinA was diluted in 4 mL 0.9% normal saline. A total of 155 units of onabotulinumtoxinA were injected using 30 gauge 0.5 in needles into the muscles listed below. 45 units of onabotulinumtoxinA were wasted.      GOAL OF PROCEDURE:  The goal of this procedure is to decrease pain and enhance functional independence.     CONSENT:  The risks, benefits, and treatment options were discussed with the patient who agreed to proceed.     Written consent was obtained      EQUIPMENT USED:  Needles-30 gauge, 0.5 inches for injections  Four 1-ml tuberculin syringes for injections  One sodium chloride 10 ml vial preservative free  Alcohol swabs     SKIN PREPARATION:  Skin preparation was performed using an alcohol wipe.     AREA/MUSCLE INJECTED:  155 units of Botox     Right upper Trapezius (upper cervical) - 5 units of Botox at 3 site/s.   Left upper Trapezius (upper cervical) - 5 units of Botox at 3 site/s.      Right cervical paraspinals - 5 units of Botox at 2 site/s.   Left cervical paraspinals - 5 units of Botox at 2 site/s.      Left occipitalis - 5 units of Botox at 3 site/s.  Right occipitalis -5 units of Botox at 3 site/s     Right Frontalis - 5 units of Botox at 2 site/s.  Left Frontalis - 5 units of Botox at 2 site/s.     Right Temporalis - 5 units of Botox at 4 site/s.  Left Temporalis - 5 units of Botox at 4 site/s.     Right  - 5 units of Botox at 1 site/s.              Left  - 5 units of Botox at 1 site/s.     Procerus - 5 units of Botox at 1 site/s.     RESPONSE TO PROCEDURE:  tolerated the procedure well and there were no immediate complications.  Patient was allowed to recover for an appropriate period of time and was discharged home in stable condition.     FOLLOW UP:  Repeat Botox injections in 12 weeks  This procedure was performed under a hospital privileging agreement with Dr. Reymundo Durant, thank you for allowing me to participate in the care of your patient.       Sincerely,    RAVI Rodriguez CNP

## 2024-09-16 ENCOUNTER — OFFICE VISIT (OUTPATIENT)
Dept: NEUROLOGY | Facility: CLINIC | Age: 44
End: 2024-09-16
Payer: COMMERCIAL

## 2024-09-16 DIAGNOSIS — G43.719 INTRACTABLE CHRONIC MIGRAINE WITHOUT AURA AND WITHOUT STATUS MIGRAINOSUS: Primary | ICD-10-CM

## 2024-09-16 PROCEDURE — 64615 CHEMODENERV MUSC MIGRAINE: CPT | Performed by: NURSE PRACTITIONER

## 2024-09-16 ASSESSMENT — HEADACHE IMPACT TEST (HIT 6)
HOW OFTEN DID HEADACHS LIMIT CONCENTRATION ON WORK OR DAILY ACTIVITY: NEVER
HIT6 TOTAL SCORE: 44
WHEN YOU HAVE HEADACHES HOW OFTEN IS THE PAIN SEVERE: RARELY
WHEN YOU HAVE A HEADACHE HOW OFTEN DO YOU WISH YOU COULD LIE DOWN: SOMETIMES
HOW OFTEN HAVE YOU FELT FED UP OR IRRITATED BECAUSE OF YOUR HEADACHES: NEVER
HOW OFTEN HAVE YOU FELT TOO TIRED TO WORK BECAUSE OF YOUR HEADACHES: NEVER
HOW OFTEN DO HEADACHES LIMIT YOUR DAILY ACTIVITIES: RARELY

## 2024-09-16 NOTE — LETTER
"9/16/2024       RE: Bhavna Rich  [address] [facility] [address]     Dear Colleague,    Thank you for referring your patient, Bhavna Rich, to the [facility] [place] at [facility]. Please see a copy of my visit note below.    PROCEDURE NOTE:     [facility]  Botulinum Toxin Procedure     Headache Neurology     09/16/24    Procedure:  OnabotulinumtoxinA injections for chronic migraine  Indication:  Chronic migraine     Bhavna suffers from severe intractable headaches.  She was referred by for onabotulinumtoxinA injections for headache.  Risks, benefits, and alternatives were discussed.  All questions were answered and consent given.  She decided to proceed with the injections.      Headache history, from initial consult note: \"Headache history-Headaches with pregnancies. Onset of migraine headaches with starting a menstrual cycle. Migraine headaches have increased in the last couple of months. Patient went to the [facility].   Headaches  stem from the back of the head and usually right side and behind right eye. Pulsing and searing pain and starts to get nausea, light and noise sensitivity. Being in the dark and quiet room usually helps.  Triggers-noise, food, not enough sleep,   History of partial hysterectomy   Headache Frequency -10-15 days per month and duration all day  \".     Prior to initiation of botulinum toxin injections, Ms. Rich reported 30 headache days per month, with 20 severe headache days per month. Her headaches are quite disabling and often interfere with her ability to function normally.     Date of last injections: 06/24/24  Ms. Rich reports no severe headaches in the past 12 weeks and did not use Nurtec at all.   She has noticed a wearing off phenomenon prior to this round of botulinum toxin injections, lasting 1 weeks.     Botulinum toxin injections have improved their " functioning.      She has attempted other migraine prophylactic treatments in the past, which have included:   Stopped Emgality due to cost 2 months ago. Over $1300 per one injection and a lot of problems with pharmacy feeling it. Emgality was effective.   Ajovy for a couple months and it has been effective but stopped because of cost.   Nurtec-did not try it - was  covered but Expected CoPay: $958.76       Sumatriptan oral has been helping and takes it once last month   Prochlorperazine-does not recall taking it-did not use it in a while   propranolol -80 mg ER at bedtime   Duloxetine 90 mg daily and prescribed by psychiatry  Tizanidine up to 16 mg at night and no side effects but causes drowsiness and cannot tell whether effective but does not want to change anything.    propranolol 80- mg ER   Sumatriptan  Naproxen   Occipital nerve blocks twice at [facility] but did not work mid-end March  Flexeril -did not help  Duloxetine 90 mg for depression for a couple of years and helps with depression  Gabapentin 100 mg TID per psychiatry and was increased 200 mg TID and caused side effects-stuttering and stopped gabapentin  lorazapam per psychiatry for panic attacks but it does not help  Medrol pack for headaches and did not help and no side effects  Mirtazapine and trazadone as needed for insomnia  Prochlorperazine 10 mg -tried once did help before but not last Saturday  Quetiapine 75 mg am and at bedtime  Rizatriptan tried twice and did not help  Sumatriptan -stopped working     Ms. Nys pain was assessed prior to the procedure.  She rated her pain today as 1 out of 10.     Procedural Pause: Procedural pause was conducted to verify correct patient identity, procedure to be performed, correct side and site, correct patient position, and special requirements. Appropriate hand hygiene was utilized, and each injection site was prepped with alcohol wipe or Chloraprep swab.      Procedure Details: 200 units of  onabotulinumtoxinA was diluted in 4 mL 0.9% normal saline. A total of 155 units of onabotulinumtoxinA were injected using 30 gauge 0.5 in needles into the muscles listed below. 45 units of onabotulinumtoxinA were wasted.      GOAL OF PROCEDURE:  The goal of this procedure is to decrease pain and enhance functional independence.     CONSENT:  The risks, benefits, and treatment options were discussed with the patient who agreed to proceed.     Written consent was obtained      EQUIPMENT USED:  Needles-30 gauge, 0.5 inches for injections  Four 1-ml tuberculin syringes for injections  One sodium chloride 10 ml vial preservative free  Alcohol swabs     SKIN PREPARATION:  Skin preparation was performed using an alcohol wipe.        AREA/MUSCLE INJECTED:  155 units of Botox     Right upper Trapezius (upper cervical) - 5 units of Botox at 3 site/s.   Left upper Trapezius (upper cervical) - 5 units of Botox at 3 site/s.      Right cervical paraspinals - 5 units of Botox at 2 site/s.   Left cervical paraspinals - 5 units of Botox at 2 site/s.      Left occipitalis - 5 units of Botox at 3 site/s.  Right occipitalis -5 units of Botox at 3 site/s     Right Frontalis - 5 units of Botox at 2 site/s.  Left Frontalis - 5 units of Botox at 2 site/s.     Right Temporalis - 5 units of Botox at 4 site/s.  Left Temporalis - 5 units of Botox at 4 site/s.     Right  - 5 units of Botox at 1 site/s.              Left  - 5 units of Botox at 1 site/s.     Procerus - 5 units of Botox at 1 site/s.     RESPONSE TO PROCEDURE:  tolerated the procedure well and there were no immediate complications.  Patient was allowed to recover for an appropriate period of time and was discharged home in stable condition.     FOLLOW UP:  Repeat Botox injections in 12 weeks        This procedure was performed under a hospital privileging agreement with RAVI Castañeda, [facility]         Again,  thank you for allowing me to participate in the care of your patient.      Sincerely,    RAVI Rodriguez CNP

## 2024-09-16 NOTE — PROGRESS NOTES
"PROCEDURE NOTE:     [facility]  Botulinum Toxin Procedure     Headache Neurology     09/16/24    Procedure:  OnabotulinumtoxinA injections for chronic migraine  Indication:  Chronic migraine     Bhavna suffers from severe intractable headaches.  She was referred by for onabotulinumtoxinA injections for headache.  Risks, benefits, and alternatives were discussed.  All questions were answered and consent given.  She decided to proceed with the injections.      Headache history, from initial consult note: \"Headache history-Headaches with pregnancies. Onset of migraine headaches with starting a menstrual cycle. Migraine headaches have increased in the last couple of months. Patient went to the [facility].   Headaches  stem from the back of the head and usually right side and behind right eye. Pulsing and searing pain and starts to get nausea, light and noise sensitivity. Being in the dark and quiet room usually helps.  Triggers-noise, food, not enough sleep,   History of partial hysterectomy   Headache Frequency -10-15 days per month and duration all day  \".     Prior to initiation of botulinum toxin injections, Ms. Rich reported 30 headache days per month, with 20 severe headache days per month. Her headaches are quite disabling and often interfere with her ability to function normally.     Date of last injections: 06/24/24  Ms. Rich reports no severe headaches in the past 12 weeks and did not use Nurtec at all.   She has noticed a wearing off phenomenon prior to this round of botulinum toxin injections, lasting 1 weeks.     Botulinum toxin injections have improved their functioning.      She has attempted other migraine prophylactic treatments in the past, which have included:   Stopped Emgality due to cost 2 months ago. Over $1300 per one injection and a lot of problems with pharmacy feeling it. Emgality was effective.   Ajovy for a couple months and it has been effective but stopped " because of cost.   Nurtec-did not try it - was  covered but Expected CoPay: $958.76       Sumatriptan oral has been helping and takes it once last month   Prochlorperazine-does not recall taking it-did not use it in a while   propranolol -80 mg ER at bedtime   Duloxetine 90 mg daily and prescribed by psychiatry  Tizanidine up to 16 mg at night and no side effects but causes drowsiness and cannot tell whether effective but does not want to change anything.    propranolol 80- mg ER   Sumatriptan  Naproxen   Occipital nerve blocks twice at [facility] but did not work mid-end March  Flexeril -did not help  Duloxetine 90 mg for depression for a couple of years and helps with depression  Gabapentin 100 mg TID per psychiatry and was increased 200 mg TID and caused side effects-stuttering and stopped gabapentin  lorazapam per psychiatry for panic attacks but it does not help  Medrol pack for headaches and did not help and no side effects  Mirtazapine and trazadone as needed for insomnia  Prochlorperazine 10 mg -tried once did help before but not last Saturday  Quetiapine 75 mg am and at bedtime  Rizatriptan tried twice and did not help  Sumatriptan -stopped working     Ms. Mccoy pain was assessed prior to the procedure.  She rated her pain today as 1 out of 10.     Procedural Pause: Procedural pause was conducted to verify correct patient identity, procedure to be performed, correct side and site, correct patient position, and special requirements. Appropriate hand hygiene was utilized, and each injection site was prepped with alcohol wipe or Chloraprep swab.      Procedure Details: 200 units of onabotulinumtoxinA was diluted in 4 mL 0.9% normal saline. A total of 155 units of onabotulinumtoxinA were injected using 30 gauge 0.5 in needles into the muscles listed below. 45 units of onabotulinumtoxinA were wasted.      GOAL OF PROCEDURE:  The goal of this procedure is to decrease pain and enhance functional independence.      CONSENT:  The risks, benefits, and treatment options were discussed with the patient who agreed to proceed.     Written consent was obtained      EQUIPMENT USED:  Needles-30 gauge, 0.5 inches for injections  Four 1-ml tuberculin syringes for injections  One sodium chloride 10 ml vial preservative free  Alcohol swabs     SKIN PREPARATION:  Skin preparation was performed using an alcohol wipe.        AREA/MUSCLE INJECTED:  155 units of Botox     Right upper Trapezius (upper cervical) - 5 units of Botox at 3 site/s.   Left upper Trapezius (upper cervical) - 5 units of Botox at 3 site/s.      Right cervical paraspinals - 5 units of Botox at 2 site/s.   Left cervical paraspinals - 5 units of Botox at 2 site/s.      Left occipitalis - 5 units of Botox at 3 site/s.  Right occipitalis -5 units of Botox at 3 site/s     Right Frontalis - 5 units of Botox at 2 site/s.  Left Frontalis - 5 units of Botox at 2 site/s.     Right Temporalis - 5 units of Botox at 4 site/s.  Left Temporalis - 5 units of Botox at 4 site/s.     Right  - 5 units of Botox at 1 site/s.              Left  - 5 units of Botox at 1 site/s.     Procerus - 5 units of Botox at 1 site/s.     RESPONSE TO PROCEDURE:  tolerated the procedure well and there were no immediate complications.  Patient was allowed to recover for an appropriate period of time and was discharged home in stable condition.     FOLLOW UP:  Repeat Botox injections in 12 weeks        This procedure was performed under a hospital privileging agreement with RAVI Castañeda, [facility]

## 2024-09-17 DIAGNOSIS — M62.838 MUSCLE SPASM: ICD-10-CM

## 2024-09-17 DIAGNOSIS — G43.719 INTRACTABLE CHRONIC MIGRAINE WITHOUT AURA AND WITHOUT STATUS MIGRAINOSUS: ICD-10-CM

## 2024-09-17 RX ORDER — PROCHLORPERAZINE MALEATE 10 MG
10 TABLET ORAL EVERY 6 HOURS PRN
Qty: 20 TABLET | Refills: 3 | Status: SHIPPED | OUTPATIENT
Start: 2024-09-17

## 2024-09-17 RX ORDER — PROPRANOLOL HYDROCHLORIDE 80 MG/1
80 CAPSULE, EXTENDED RELEASE ORAL AT BEDTIME
Qty: 90 CAPSULE | Refills: 3 | Status: SHIPPED | OUTPATIENT
Start: 2024-09-17

## 2024-10-21 ENCOUNTER — MYC MEDICAL ADVICE (OUTPATIENT)
Dept: NEUROLOGY | Facility: CLINIC | Age: 44
End: 2024-10-21
Payer: COMMERCIAL

## 2024-10-21 DIAGNOSIS — G43.719 INTRACTABLE CHRONIC MIGRAINE WITHOUT AURA AND WITHOUT STATUS MIGRAINOSUS: Primary | ICD-10-CM

## 2024-10-21 RX ORDER — METHYLPREDNISOLONE 4 MG/1
TABLET ORAL
Qty: 21 TABLET | Refills: 0 | Status: SHIPPED | OUTPATIENT
Start: 2024-10-21 | End: 2024-11-05

## 2024-10-21 NOTE — TELEPHONE ENCOUNTER
LVM for pt to discuss headache symptoms to come up with a better treatment plan. Requested a call back but will also MyChart pt.

## 2024-11-05 ENCOUNTER — OFFICE VISIT (OUTPATIENT)
Dept: NEUROLOGY | Facility: CLINIC | Age: 44
End: 2024-11-05
Payer: COMMERCIAL

## 2024-11-05 VITALS
OXYGEN SATURATION: 99 % | RESPIRATION RATE: 18 BRPM | DIASTOLIC BLOOD PRESSURE: 83 MMHG | SYSTOLIC BLOOD PRESSURE: 118 MMHG | HEART RATE: 89 BPM

## 2024-11-05 DIAGNOSIS — G43.719 INTRACTABLE CHRONIC MIGRAINE WITHOUT AURA AND WITHOUT STATUS MIGRAINOSUS: Primary | ICD-10-CM

## 2024-11-05 PROCEDURE — 99213 OFFICE O/P EST LOW 20 MIN: CPT | Performed by: NURSE PRACTITIONER

## 2024-11-05 RX ORDER — TRAZODONE HYDROCHLORIDE 100 MG/1
200 TABLET ORAL AT BEDTIME
COMMUNITY
Start: 2024-09-25

## 2024-11-05 RX ORDER — TOPIRAMATE 25 MG/1
TABLET, FILM COATED ORAL
Qty: 120 TABLET | Refills: 3 | Status: SHIPPED | OUTPATIENT
Start: 2024-11-05

## 2024-11-05 RX ORDER — LORAZEPAM 0.5 MG/1
1 TABLET ORAL 2 TIMES DAILY PRN
COMMUNITY
Start: 2024-10-30

## 2024-11-05 RX ORDER — PROPRANOLOL HCL 20 MG
20 TABLET ORAL AT BEDTIME
COMMUNITY
Start: 2024-11-04

## 2024-11-05 RX ORDER — MIRTAZAPINE 30 MG/1
30 TABLET, FILM COATED ORAL AT BEDTIME
COMMUNITY
Start: 2024-11-04

## 2024-11-05 ASSESSMENT — MIGRAINE DISABILITY ASSESSMENT (MIDAS)
HOW MANY DAYS WAS YOUR PRODUCTIVITY CUT IN HALF BECAUSE OF HEADACHES: 2
HOW MANY DAYS WAS HOUSEWORK PRODUCTIVITY CUT IN HALF DUE TO HEADACHES: 5
HOW MANY DAYS DID YOU NOT DO HOUSEWORK BECAUSE OF HEADACHES: 7
HOW MANY DAYS DID YOU MISS WORK OR SCHOOL BECAUSE OF HEADACHES: 1
HOW OFTEN WERE SOCIAL ACTIVITIES MISSED DUE TO HEADACHES: 0
ON A SCALE FROM 0-10 ON AVERAGE HOW PAINFUL WERE HEADACHES: 7
HOW MANY DAYS IN THE PAST 3 MONTHS HAVE YOU HAD A HEADACHE: 10
TOTAL SCORE: 15

## 2024-11-05 ASSESSMENT — HEADACHE IMPACT TEST (HIT 6)
HOW OFTEN HAVE YOU FELT TOO TIRED TO WORK BECAUSE OF YOUR HEADACHES: SOMETIMES
HIT6 TOTAL SCORE: 59
HOW OFTEN HAVE YOU FELT FED UP OR IRRITATED BECAUSE OF YOUR HEADACHES: SOMETIMES
HOW OFTEN DID HEADACHS LIMIT CONCENTRATION ON WORK OR DAILY ACTIVITY: RARELY
WHEN YOU HAVE A HEADACHE HOW OFTEN DO YOU WISH YOU COULD LIE DOWN: VERY OFTEN
HOW OFTEN DO HEADACHES LIMIT YOUR DAILY ACTIVITIES: SOMETIMES
WHEN YOU HAVE HEADACHES HOW OFTEN IS THE PAIN SEVERE: SOMETIMES

## 2024-11-05 ASSESSMENT — PAIN SCALES - GENERAL: PAINLEVEL_OUTOF10: MILD PAIN (2)

## 2024-11-05 NOTE — NURSING NOTE
Chief Complaint   Patient presents with    RECHECK     /83 (BP Location: Left arm, Patient Position: Sitting, Cuff Size: Adult Regular)   Pulse 89   Resp 18   LMP 08/01/2012   SpO2 99%     NITA LUCAS

## 2024-11-05 NOTE — PROGRESS NOTES
[facility]    Headache Neurology Progress Note  November 5, 2024      Assessment/Plan:   Bhavna Rich is a 43 year old   Chronic migraine   Chronic pain     Acute Treatment:  -For acute treatment of mild headache, take acetaminophen or ibuprofen as needed. Do not exceed more than 14 days per month to avoid medication overuse.  -For acute treatment of moderate to severe headache, take nurtec at the onset of headache,  -For headache related nausea, or as a rescue medicine for headache, take prochlorperazine as needed.     Preventive Treatment:  -For headache prevention, continue Botox   Migraine headache prevention-a trial of topiramate 25 mg at bedtime for one week, then 50 mg at bedtime for one week, then take 75 mg (3 tabs) at bedtime for a week than try 100 mg at bedtime if tolerated   Side effects-stay hydrated and drink at least 10+glasses of water to decrease risk of kidney stones, may cause tingling in the hands and feet, taste changes, glaucoma, nausea, weight stable or loss, mood changes or worsening depression, cognitive effect      Follow up in 3 month or sooner if needed   Botox as scheduled       The longitudinal plan of care for Bhavna was addressed during this visit. Due to the added complexity in care, I will continue to support Bhavna in the subsequent management of this condition(s) and with the ongoing continuity of care of this condition(s).      26 minutes spent on the date of the encounter doing face to face visit, chart  review, exam, results review,  meds review, treatment plan, documentation and further activities as noted above    RAVI Mckeon, CNP [facility]        Subjective:    Bhavna Rich returns for follow up of headaches     Saw new GP-duloxetine 60 mg BID since yesterday-dose change  Fatigue extreme and muscle pain -weaning off mirtazapine and should help fatigue and weight change  Propranolol 20 mg at  bedtime down from 80 mg and everything keeping the same  Sleep study per PCP -because of fatigue  Headache Tx currently  Nurtec as needed and it helps   Currently on Botox -has been helpful -4/month very severe and had to leave work one day   he has attempted other migraine prophylactic treatments in the past, which have included:   Stopped Emgality due to cost 2 months ago. Over $1300 per one injection and a lot of problems with pharmacy feeling it. Emgality was effective.   Ajovy for a couple months and it has been effective but stopped because of cost.   Nurtec-did not try it - was  covered but Expected CoPay: $958.76       Sumatriptan oral has been helping and takes it once last month   Prochlorperazine-does not recall taking it-did not use it in a while   propranolol -80 mg ER at bedtime   Duloxetine 90 mg daily and prescribed by psychiatry  Tizanidine up to 16 mg at night and no side effects but causes drowsiness and cannot tell whether effective but does not want to change anything.    propranolol 80- mg ER   Sumatriptan  Naproxen   Occipital nerve blocks twice at [facility] but did not work mid-end March  Flexeril -did not help  Duloxetine 90 mg for depression for a couple of years and helps with depression  Gabapentin 100 mg TID per psychiatry and was increased 200 mg TID and caused side effects-stuttering and stopped gabapentin  lorazapam per psychiatry for panic attacks but it does not help  Medrol pack for headaches and did not help and no side effects  Mirtazapine and trazadone as needed for insomnia  Prochlorperazine 10 mg -tried once did help before but not last Saturday  Quetiapine 75 mg am and at bedtime  Rizatriptan tried twice and did not help  Sumatriptan -stopped working    Objective:   Vitals: /83 (BP Location: Left arm, Patient Position: Sitting, Cuff Size: Adult Regular)   Pulse 89   Resp 18   LMP 08/01/2012   SpO2 99%   General: Cooperative, NAD  Neurologic:  Mental Status: Fully alert,  attentive and oriented. Speech clear and fluent.   Cranial Nerves: Facial movements symmetric.   Motor: No abnormal movements.      Pertinent Investigations:    ESR 13 and CRP <0.5   CBC and CMP were normal         6/23/2024     6:10 PM 9/16/2024     8:16 AM 11/5/2024    10:47 AM   HIT-6   When you have headaches, how often is the pain severe 8  8  10    How often do headaches limit your ability to do usual daily activities including household work, work, school, or social activities? 8  8  10    When you have a headache, how often do you wish you could lie down? 13  10  11    In the past 4 weeks, how often have you felt too tired to do work or daily activities because of your headaches 6  6  10    In the past 4 weeks, how often have you felt fed up or irritated because of your headaches 6  6  10    In the past 4 weeks, how often did headaches limit your ability to concentrate on work or daily activities 6  6  8    HIT-6 Total Score 47 44 59        Patient-reported           1/11/2023    12:36 PM 1/3/2024     1:42 PM 11/5/2024    10:49 AM   MIDAS - in the past three months:   On how many days did you miss work or school because of your headaches? 0 0 1   How many days was your productivity at work or school reduced by half or more because of your headaches? 5 0 2   On how many days did you not do household work because of your headaches? 5 0 7   How many days was your productivity in household work reduced by half or more because of your headaches? 5 0 5   On how many days did you miss family, social, or leisure activities because of your headaches? 0 0 0   On how many days did you have a headache? 7 3 10   On a scale of 0-10, on average how painful were these headaches? 5 5 7   MIDAS Score 15 (III - Moderate Disability) 0 (I - Little or No Disability) 15 (III - Moderate Disability)

## 2024-11-05 NOTE — PATIENT INSTRUCTIONS
Acute Treatment:  -For acute treatment of mild headache, take acetaminophen or ibuprofen as needed. Do not exceed more than 14 days per month to avoid medication overuse.  -For acute treatment of moderate to severe headache, take nurtec at the onset of headache,  -For headache related nausea, or as a rescue medicine for headache, take prochlorperazine as needed.     Preventive Treatment:  -For headache prevention, continue Botox   Migraine headache prevention-a trial of topiramate 25 mg at bedtime for one week, then 50 mg at bedtime for one week, then take 75 mg (3 tabs) at bedtime for a week than try 100 mg at bedtime if tolerated   Side effects-stay hydrated and drink at least 10+glasses of water to decrease risk of kidney stones, may cause tingling in the hands and feet, taste changes, glaucoma, nausea, weight stable or loss, mood changes or worsening depression, cognitive effect      Follow up in 3 month or sooner if needed   Botox as scheduled    Dear Dr. Reina Selby,    We are contacting you regarding your patient's eligibility for a disease management program for diabetes.     The Metformin Dose Optimization program:  Is a 12 week telephonic program with a clinical pharmacist  Helps patients maximize their metformin therapy, improve glycemic control, and avoid the need for additional diabetes medications  Includes counseling on adherence, lifestyle, and self-management  Utilizes a dose titration protocol to increase metformin to target doses as tolerated  Enhances in-between visit care at no additional cost to your patient      To enroll Kary in this program, simply co-sign the \"Service to [facility]\" referral order.     Her current metformin dose is: 500 mg twice daily  Her most recent A1c was   Last Lab A1C:  Hemoglobin A1C (%)   Date Value   06/05/2024 8.7 (H)     We will contact Kary to initiate the program if appropriate and if next A1c is elevated. All patient outreach will be documented in Epic and can be accessed by you or your staff at any time. We will check in with you again once the program is complete.     Please feel free to contact us with any questions or concerns.     Thank you,     Titus Duffy, PharmD  [facility] Clinical Pharmacist  [phone]

## 2024-11-05 NOTE — LETTER
11/5/2024       RE: Bhavna iRch  [address] [address] [facility] [facility] [address]     Dear Colleague,    Thank you for referring your patient, Bhavna Rich, to the [facility] NEUROLOGY CLINIC [place] at [facility]. Please see a copy of my visit note below.    [facility]    Headache Neurology Progress Note  November 5, 2024      Assessment/Plan:   Bhavna Rich is a 43 year old   Chronic migraine   Chronic pain     Acute Treatment:  -For acute treatment of mild headache, take acetaminophen or ibuprofen as needed. Do not exceed more than 14 days per month to avoid medication overuse.  -For acute treatment of moderate to severe headache, take nurtec at the onset of headache,  -For headache related nausea, or as a rescue medicine for headache, take prochlorperazine as needed.     Preventive Treatment:  -For headache prevention, continue Botox   Migraine headache prevention-a trial of topiramate 25 mg at bedtime for one week, then 50 mg at bedtime for one week, then take 75 mg (3 tabs) at bedtime for a week than try 100 mg at bedtime if tolerated   Side effects-stay hydrated and drink at least 10+glasses of water to decrease risk of kidney stones, may cause tingling in the hands and feet, taste changes, glaucoma, nausea, weight stable or loss, mood changes or worsening depression, cognitive effect      Follow up in 3 month or sooner if needed   Botox as scheduled       The longitudinal plan of care for Bhavna was addressed during this visit. Due to the added complexity in care, I will continue to support Bhavna in the subsequent management of this condition(s) and with the ongoing continuity of care of this condition(s).      26 minutes spent on the date of the encounter doing face to face visit, chart  review, exam, results review,  meds review, treatment plan, documentation and further activities as noted  above    RAVI Mckeon, CNP [facility]  [facility]        Subjective:    Bhavna Rich returns for follow up of headaches     Saw new GP-duloxetine 60 mg BID since yesterday-dose change  Fatigue extreme and muscle pain -weaning off mirtazapine and should help fatigue and weight change  Propranolol 20 mg at bedtime down from 80 mg and everything keeping the same  Sleep study per PCP -because of fatigue  Headache Tx currently  Nurtec as needed and it helps   Currently on Botox -has been helpful -4/month very severe and had to leave work one day   he has attempted other migraine prophylactic treatments in the past, which have included:   Stopped Emgality due to cost 2 months ago. Over $1300 per one injection and a lot of problems with pharmacy feeling it. Emgality was effective.   Ajovy for a couple months and it has been effective but stopped because of cost.   Nurtec-did not try it - was  covered but Expected CoPay: $958.76       Sumatriptan oral has been helping and takes it once last month   Prochlorperazine-does not recall taking it-did not use it in a while   propranolol -80 mg ER at bedtime   Duloxetine 90 mg daily and prescribed by psychiatry  Tizanidine up to 16 mg at night and no side effects but causes drowsiness and cannot tell whether effective but does not want to change anything.    propranolol 80- mg ER   Sumatriptan  Naproxen   Occipital nerve blocks twice at [facility] but did not work mid-end March  Flexeril -did not help  Duloxetine 90 mg for depression for a couple of years and helps with depression  Gabapentin 100 mg TID per psychiatry and was increased 200 mg TID and caused side effects-stuttering and stopped gabapentin  lorazapam per psychiatry for panic attacks but it does not help  Medrol pack for headaches and did not help and no side effects  Mirtazapine and trazadone as needed for insomnia  Prochlorperazine 10 mg -tried once did help before but not last  Saturday  Quetiapine 75 mg am and at bedtime  Rizatriptan tried twice and did not help  Sumatriptan -stopped working    Objective:   Vitals: /83 (BP Location: Left arm, Patient Position: Sitting, Cuff Size: Adult Regular)   Pulse 89   Resp 18   LMP 08/01/2012   SpO2 99%   General: Cooperative, NAD  Neurologic:  Mental Status: Fully alert, attentive and oriented. Speech clear and fluent.   Cranial Nerves: Facial movements symmetric.   Motor: No abnormal movements.      Pertinent Investigations:    ESR 13 and CRP <0.5   CBC and CMP were normal         6/23/2024     6:10 PM 9/16/2024     8:16 AM 11/5/2024    10:47 AM   HIT-6   When you have headaches, how often is the pain severe 8  8  10    How often do headaches limit your ability to do usual daily activities including household work, work, school, or social activities? 8  8  10    When you have a headache, how often do you wish you could lie down? 13  10  11    In the past 4 weeks, how often have you felt too tired to do work or daily activities because of your headaches 6  6  10    In the past 4 weeks, how often have you felt fed up or irritated because of your headaches 6  6  10    In the past 4 weeks, how often did headaches limit your ability to concentrate on work or daily activities 6  6  8    HIT-6 Total Score 47 44 59        Patient-reported           1/11/2023    12:36 PM 1/3/2024     1:42 PM 11/5/2024    10:49 AM   MIDAS - in the past three months:   On how many days did you miss work or school because of your headaches? 0 0 1   How many days was your productivity at work or school reduced by half or more because of your headaches? 5 0 2   On how many days did you not do household work because of your headaches? 5 0 7   How many days was your productivity in household work reduced by half or more because of your headaches? 5 0 5   On how many days did you miss family, social, or leisure activities because of your headaches? 0 0 0   On how many  days did you have a headache? 7 3 10   On a scale of 0-10, on average how painful were these headaches? 5 5 7   MIDAS Score 15 (III - Moderate Disability) 0 (I - Little or No Disability) 15 (III - Moderate Disability)          Again, thank you for allowing me to participate in the care of your patient.      Sincerely,    RAVI Rodriguez CNP

## 2024-11-21 DIAGNOSIS — G43.719 INTRACTABLE CHRONIC MIGRAINE WITHOUT AURA AND WITHOUT STATUS MIGRAINOSUS: Primary | ICD-10-CM

## 2024-11-21 RX ORDER — VERAPAMIL HYDROCHLORIDE 40 MG/1
TABLET ORAL
Qty: 60 TABLET | Refills: 3 | Status: SHIPPED | OUTPATIENT
Start: 2024-11-21

## 2024-12-09 DIAGNOSIS — G43.719 INTRACTABLE CHRONIC MIGRAINE WITHOUT AURA AND WITHOUT STATUS MIGRAINOSUS: Primary | ICD-10-CM

## 2024-12-09 RX ORDER — VERAPAMIL HYDROCHLORIDE 120 MG/1
120 TABLET, FILM COATED, EXTENDED RELEASE ORAL AT BEDTIME
Qty: 90 TABLET | Refills: 3 | Status: SHIPPED | OUTPATIENT
Start: 2024-12-09

## 2024-12-10 ENCOUNTER — TELEPHONE (OUTPATIENT)
Dept: NEUROLOGY | Facility: CLINIC | Age: 44
End: 2024-12-10
Payer: COMMERCIAL

## 2024-12-10 DIAGNOSIS — G43.719 INTRACTABLE CHRONIC MIGRAINE WITHOUT AURA AND WITHOUT STATUS MIGRAINOSUS: Primary | ICD-10-CM
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
## 2024-12-13 NOTE — TELEPHONE ENCOUNTER
PA Initiation    Medication: EMGALITY 120 MG/ML SC SOAJ  Insurance Company: [employer] - Phone [phone] Fax [phone]  Pharmacy Filling the Rx: [id number] [place] [facility] [id number]  GIUSEPPE PANIAGUA  Filling Pharmacy Phone: [phone]  Filling Pharmacy Fax:    Start Date: 12/13/2024    SENT WITH CLINIC NOTES, TE NOTES, AND MED LIST

## 2024-12-17 NOTE — TELEPHONE ENCOUNTER
PRIOR AUTHORIZATION DENIED    Medication: EMGALITY 120 MG/ML SC SOAJ  Insurance Company: [employer] - Phone [phone] Fax [phone]  Denial Date: 12/17/2024  Denial Reason(s): SEE DENIAL LETTER BELOW  Appeal Information:       Patient Notified: NO

## 2025-01-08 ENCOUNTER — TELEPHONE (OUTPATIENT)
Dept: NEUROLOGY | Facility: CLINIC | Age: 45
End: 2025-01-08
Payer: COMMERCIAL

## 2025-01-08 NOTE — TELEPHONE ENCOUNTER
LVM - incorrectly scheduled on 2/7 as in-person. So is virtual at this time. Appt was converted to virtual. Patient to call back if she needs to reschedule. Clinic # provided.

## 2025-01-26 ENCOUNTER — HEALTH MAINTENANCE LETTER (OUTPATIENT)
Age: 45
End: 2025-01-26

## 2025-04-15 ENCOUNTER — OFFICE VISIT (OUTPATIENT)
Dept: NEUROLOGY | Facility: CLINIC | Age: 45
End: 2025-04-15
Payer: COMMERCIAL

## 2025-04-15 VITALS
RESPIRATION RATE: 16 BRPM | HEART RATE: 70 BPM | OXYGEN SATURATION: 100 % | DIASTOLIC BLOOD PRESSURE: 81 MMHG | SYSTOLIC BLOOD PRESSURE: 136 MMHG

## 2025-04-15 DIAGNOSIS — G43.709 CHRONIC MIGRAINE WITHOUT AURA WITHOUT STATUS MIGRAINOSUS, NOT INTRACTABLE: ICD-10-CM

## 2025-04-15 DIAGNOSIS — R20.2 NUMBNESS AND TINGLING OF BOTH UPPER EXTREMITIES: ICD-10-CM

## 2025-04-15 DIAGNOSIS — R42 VERTIGO: ICD-10-CM

## 2025-04-15 DIAGNOSIS — R20.0 NUMBNESS AND TINGLING OF BOTH UPPER EXTREMITIES: ICD-10-CM

## 2025-04-15 DIAGNOSIS — R42 DIZZINESS: Primary | ICD-10-CM

## 2025-04-15 RX ORDER — PROPRANOLOL HYDROCHLORIDE 10 MG/1
10 TABLET ORAL PRN
COMMUNITY
Start: 2025-03-26

## 2025-04-15 RX ORDER — ONDANSETRON 4 MG/1
4 TABLET, ORALLY DISINTEGRATING ORAL PRN
COMMUNITY
Start: 2025-02-21

## 2025-04-15 ASSESSMENT — HEADACHE IMPACT TEST (HIT 6)
WHEN YOU HAVE HEADACHES HOW OFTEN IS THE PAIN SEVERE: RARELY
HOW OFTEN DID HEADACHS LIMIT CONCENTRATION ON WORK OR DAILY ACTIVITY: RARELY
HIT6 TOTAL SCORE: 51
WHEN YOU HAVE A HEADACHE HOW OFTEN DO YOU WISH YOU COULD LIE DOWN: ALWAYS
HOW OFTEN HAVE YOU FELT FED UP OR IRRITATED BECAUSE OF YOUR HEADACHES: NEVER
HOW OFTEN HAVE YOU FELT TOO TIRED TO WORK BECAUSE OF YOUR HEADACHES: RARELY
HOW OFTEN DO HEADACHES LIMIT YOUR DAILY ACTIVITIES: RARELY

## 2025-04-15 ASSESSMENT — MIGRAINE DISABILITY ASSESSMENT (MIDAS)
HOW MANY DAYS DID YOU NOT DO HOUSEWORK BECAUSE OF HEADACHES: 2
HOW OFTEN WERE SOCIAL ACTIVITIES MISSED DUE TO HEADACHES: 0
HOW MANY DAYS WAS YOUR PRODUCTIVITY CUT IN HALF BECAUSE OF HEADACHES: 2
HOW MANY DAYS WAS HOUSEWORK PRODUCTIVITY CUT IN HALF DUE TO HEADACHES: 0
TOTAL SCORE: 4
HOW MANY DAYS DID YOU MISS WORK OR SCHOOL BECAUSE OF HEADACHES: 0
ON A SCALE FROM 0-10 ON AVERAGE HOW PAINFUL WERE HEADACHES: 4
HOW MANY DAYS IN THE PAST 3 MONTHS HAVE YOU HAD A HEADACHE: 4

## 2025-04-15 ASSESSMENT — PAIN SCALES - GENERAL: PAINLEVEL_OUTOF10: NO PAIN (0)

## 2025-04-15 NOTE — NURSING NOTE
Chief Complaint   Patient presents with    RECHECK     Return Headache          /81 (BP Location: Right arm, Patient Position: Sitting, Cuff Size: Adult Large)   Pulse 70   Resp 16   LMP 08/01/2012   SpO2 100%     Padmini Nevarez

## 2025-04-15 NOTE — PATIENT INSTRUCTIONS
Dizziness/vertigo and arms numbness   Brain MRI with and without a contrast, cervical MRI for any structural abnormalities  If no improvement in your symptoms I would suggest head/neck CTV for venous obstruction   Follow up -send MyChart message
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
## 2025-04-15 NOTE — PROGRESS NOTES
[facility]    Headache Neurology Progress Note  April 15, 2025      Assessment/Plan:   Bhavna Rich is a 44 year old   Dizziness/vertigo and arms numbness   Brain MRI with and without a contrast, cervical MRI for any structural abnormalities  If no improvement in your symptoms I would suggest head/neck CTV for venous obstruction   ENT visit for vertigo if no improvement     Chronic migraine stable with restarting Emgality treatment. No side effects or any migraine related concerns.     The longitudinal plan of care for chronic migraines for Bhavna was addressed during this visit. Due to the added complexity in care, I will continue to support Bhavna in the subsequent management of this condition(s) and with the ongoing continuity of care of this condition(s).    43 minutes spent on the date of the encounter doing face to face visit, chart  review, results review,  meds review, treatment plan, documentation and further activities as noted above    RAVI Mckeon, CNP [facility]    Subjective:    Bhavna Rich returns for follow up of headaches  Last office visit 1/3/2024, see note for details  Has been going to PT for vertigo and has been effective. Has not have  vertigo for 2 weeks but for 3 months. Onset end of January 2025 abrupt and a comprehensive work up-outside, records reviewed. Laying and rolling did not bother but from sitting to standing would trigger the symptoms -possible orthostatic vs vertigo medication related vs or other structural reasons  Arms tingling and hard to use them when putting hair in the ponny tail.   Emgality was too expensive   Botox -helped initially but break thru headaches all the time and was taking nurtec. Added topiramate in Nov 2024 and side effects-could not form sentences and felt awful. Stopped topiramate 3 weeks after trial.   Restarted Emgality in Dec 2024. It has been working well and no migraines.  Emgality has helped 99.5% and no side effects.       Saw new GP-duloxetine 60 mg BID -dose change in Nov and down 90 mg   Fatigue extreme and muscle pain -weaning off mirtazapine and should help fatigue and weight change  Propranolol 20 mg at bedtime down from 80 mg and everything keeping the same  Sleep study per PCP -because of fatigue  Headache Tx   Nurtec as needed and it helps   Botox -has been helpful -4/month very severe and had to leave work one day but than headaches got bed again  he has attempted other migraine prophylactic treatments in the past, which have included:   Stopped Emgality due to cost 2 months ago. Over $1300 per one injection and a lot of problems with pharmacy feeling it. Emgality was effective.   Ajovy for a couple months and it has been effective but stopped because of cost.   Nurtec-did not try it - was  covered but Expected CoPay: $958.76       Sumatriptan oral has been helping and takes it once last month   Prochlorperazine-does not recall taking it-did not use it in a while   propranolol -80 mg ER at bedtime   Duloxetine 90 mg daily and prescribed by psychiatry  Tizanidine up to 16 mg at night and no side effects but causes drowsiness and cannot tell whether effective but does not want to change anything.    propranolol 80- mg ER   Sumatriptan  Naproxen   Occipital nerve blocks twice at [facility] but did not work mid-end March  Flexeril -did not help  Duloxetine 90 mg for depression for a couple of years and helps with depression  Gabapentin 100 mg TID per psychiatry and was increased 200 mg TID and caused side effects-stuttering and stopped gabapentin  lorazapam per psychiatry for panic attacks but it does not help  Medrol pack for headaches and did not help and no side effects  Mirtazapine and trazadone as needed for insomnia  Prochlorperazine 10 mg -tried once did help before but not last Saturday  Quetiapine 75 mg am and at bedtime  Rizatriptan tried twice and did not  help  Sumatriptan -stopped working  Verapamil was helpful initially but low blood pressure     ENT surgery -turbinate reduction and septoplasty in February 2025. Made a huge difference due to can breath thru her nose.       Objective:   Vitals: /81 (BP Location: Right arm, Patient Position: Sitting, Cuff Size: Adult Large)   Pulse 70   Resp 16   LMP 08/01/2012   SpO2 100%   General: Cooperative, NAD  Neurologic:  Mental Status: Fully alert, attentive and oriented. Speech clear and fluent. CN 2-12 intact, no nystagmus  Motor: No abnormal movements.    Tandem unsteady but ok walking on feet and heels No weakness in upper and lower extremities .   Romberg intact  DTRs -symmetrical     Pertinent Investigations:    reviewed  MR BRAIN W/O & W CONTRAST 4/14/2021 11:29 AM     Provided History: Headache, chronic, new features or increased  frequency; Intractable chronic migraine without aura and without  status migrainosus.  ICD-10: Intractable chronic migraine without aura and without status  migrainosus     Comparison: Head CT 7/10/2017.     Technique: Multiplanar T1-weighted, axial FLAIR, and susceptibility  images were obtained without intravenous contrast. Following  intravenous gadolinium-based contrast administration, axial  T2-weighted, diffusion, and T1-weighted images (in multiple planes)  were obtained.     Contrast: 6 cc Gadavist     Findings:  There is no mass effect, midline shift, or evidence of intracranial  hemorrhage. The ventricles are proportionate to the cerebral sulci.  Normal major vascular intracranial flow-voids.     Postcontrast images demonstrate no abnormal intracranial enhancement.     No abnormality of the skull marrow signal. The visualized portions of  paranasal sinuses, and mastoid air cells are relatively clear. The  orbits are grossly unremarkable.                                                                      Impression:  Essentially normal brain MR with contrast.      GILDA MEDRANO MD        9/16/2024     8:16 AM 11/5/2024    10:47 AM 4/15/2025     6:29 AM   HIT-6   When you have headaches, how often is the pain severe 8 10 8   How often do headaches limit your ability to do usual daily activities including household work, work, school, or social activities? 8 10 8   When you have a headache, how often do you wish you could lie down? 10 11 13   In the past 4 weeks, how often have you felt too tired to do work or daily activities because of your headaches 6 10 8   In the past 4 weeks, how often have you felt fed up or irritated because of your headaches 6 10 6   In the past 4 weeks, how often did headaches limit your ability to concentrate on work or daily activities 6 8 8   HIT-6 Total Score 44 59  51        Patient-reported           1/3/2024     1:42 PM 11/5/2024    10:49 AM 4/15/2025     6:31 AM   MIDAS - in the past three months:   On how many days did you miss work or school because of your headaches? 0 1 0   How many days was your productivity at work or school reduced by half or more because of your headaches? 0 2 2   On how many days did you not do household work because of your headaches? 0 7 2   How many days was your productivity in household work reduced by half or more because of your headaches? 0 5 0   On how many days did you miss family, social, or leisure activities because of your headaches? 0 0 0   On how many days did you have a headache? 3 10 4   On a scale of 0-10, on average how painful were these headaches? 5 7 4   MIDAS Score 0 (I - Little or No Disability) 15 (III - Moderate Disability) 4 (I - Little or No Disability)

## 2025-04-17 ENCOUNTER — MYC MEDICAL ADVICE (OUTPATIENT)
Dept: NEUROLOGY | Facility: CLINIC | Age: 45
End: 2025-04-17
Payer: COMMERCIAL

## 2025-04-22 DIAGNOSIS — R42 VERTIGO: Primary | ICD-10-CM

## 2025-04-22 RX ORDER — MECLIZINE HYDROCHLORIDE 25 MG/1
25 TABLET ORAL 3 TIMES DAILY PRN
Qty: 30 TABLET | Refills: 0 | Status: SHIPPED | OUTPATIENT
Start: 2025-04-22

## 2025-05-02 ENCOUNTER — HOSPITAL ENCOUNTER (OUTPATIENT)
Dept: MRI IMAGING | Facility: CLINIC | Age: 45
Discharge: HOME | End: 2025-05-02
Attending: NURSE PRACTITIONER | Admitting: NURSE PRACTITIONER
Payer: COMMERCIAL

## 2025-05-02 DIAGNOSIS — R20.2 NUMBNESS AND TINGLING OF BOTH UPPER EXTREMITIES: ICD-10-CM

## 2025-05-02 DIAGNOSIS — R42 VERTIGO: ICD-10-CM

## 2025-05-02 DIAGNOSIS — R20.0 NUMBNESS AND TINGLING OF BOTH UPPER EXTREMITIES: ICD-10-CM

## 2025-05-02 PROCEDURE — A9585 GADOBUTROL INJECTION: HCPCS | Performed by: NURSE PRACTITIONER

## 2025-05-02 PROCEDURE — 72156 MRI NECK SPINE W/O & W/DYE: CPT

## 2025-05-02 PROCEDURE — 255N000002 HC RX 255 OP 636: Performed by: NURSE PRACTITIONER

## 2025-05-02 PROCEDURE — 70553 MRI BRAIN STEM W/O & W/DYE: CPT

## 2025-05-02 RX ORDER — GADOBUTROL 604.72 MG/ML
0.1 INJECTION INTRAVENOUS ONCE
Status: COMPLETED | OUTPATIENT
Start: 2025-05-02 | End: 2025-05-02

## 2025-05-02 RX ADMIN — GADOBUTROL 7.5 ML: 604.72 INJECTION INTRAVENOUS at 15:13

## 2025-05-07 ENCOUNTER — RESULTS FOLLOW-UP (OUTPATIENT)
Dept: NEUROLOGY | Facility: CLINIC | Age: 45
End: 2025-05-07

## 2025-05-07 NOTE — RESULT ENCOUNTER NOTE
Gerardo Huggins,  Your recent neck MRI results/images reviewed. There are findings of chronic degenerative changes but no abnormal spinal cord  lesions at this time.  I would suggest to see a spine specialist for evaluation and appropriate treatment. Let me know if any questions.   Take care,  So

## 2025-05-07 NOTE — RESULT ENCOUNTER NOTE
Gerardo Huggins,  Your brain MRI results/images reviewed and no acute changes at this time. Follow up as discussed.   Take care,   So

## 2025-05-08 DIAGNOSIS — R20.2 NUMBNESS AND TINGLING OF BOTH UPPER EXTREMITIES: Primary | ICD-10-CM

## 2025-05-08 DIAGNOSIS — M47.812 CERVICAL SPONDYLOSIS WITHOUT MYELOPATHY: ICD-10-CM

## 2025-05-08 DIAGNOSIS — R20.0 NUMBNESS AND TINGLING OF BOTH UPPER EXTREMITIES: Primary | ICD-10-CM

## 2025-05-12 ENCOUNTER — PATIENT OUTREACH (OUTPATIENT)
Dept: CARE COORDINATION | Facility: CLINIC | Age: 45
End: 2025-05-12
Payer: COMMERCIAL

## 2025-05-14 ENCOUNTER — PATIENT OUTREACH (OUTPATIENT)
Dept: CARE COORDINATION | Facility: CLINIC | Age: 45
End: 2025-05-14
Payer: COMMERCIAL

## 2025-05-18 DIAGNOSIS — G43.009 MIGRAINE WITHOUT AURA AND WITHOUT STATUS MIGRAINOSUS, NOT INTRACTABLE: ICD-10-CM

## 2025-05-19 NOTE — TELEPHONE ENCOUNTER
Neuroscience Clinic Task Note    TASK    Neurology Rx Refill  Medication rimegepant (NURTEC) 75 MG ODT tablet    Dose Place 1 tablet (75 mg) under the tongue daily as needed for migraine Maximum of 1 tablet every 24 hours.    Last refill ordered (m/d/y) 03/29/2024   Last quantity ordered 8   Last # refills 11   Last clinic visit with ordering provider (m/d/y) 04/15/2025   Next clinic visit with ordering provider (m/d/y) None Scheduled    All pertinent protocol data (lab date/result)    Pertinent information from patient's message        FOLLOW-UP      ADDITIONAL COMMENTS      Padmini Nevarez

## 2025-05-20 ENCOUNTER — TELEPHONE (OUTPATIENT)
Dept: NEUROLOGY | Facility: CLINIC | Age: 45
End: 2025-05-20
Payer: COMMERCIAL

## 2025-05-20 RX ORDER — RIMEGEPANT SULFATE 75 MG/75MG
TABLET, ORALLY DISINTEGRATING ORAL
Qty: 8 TABLET | Refills: 11 | Status: SHIPPED | OUTPATIENT
Start: 2025-05-20

## 2025-05-20 NOTE — TELEPHONE ENCOUNTER
Neuroscience Clinic Task Note    TASK    Medication/Dose: Nurtec 75 mg  ICD code (if different than what is on RX):  G43.009  Previously Tried and Failed:     Propranolol stopped  Occipital nerve blocks twice at [facility] but did not work mid-end March  Flexeril -did not help  Duloxetine 90 mg for depression for a couple of years and helps with depression  Gabapentin 100 mg TID per psychiatry and was increased 200 mg TID and caused side effects-stuttering and stopped gabapentin  lorazapam per psychiatry for panic attacks but it does not help  Medrol pack for headaches and did not help and no side effects  Mirtazapine and trazadone as needed for insomnia  Prochlorperazine 10 mg -tried once did help before but not last Saturday  Quetiapine 75 mg am and at bedtime  Rizatriptan tried twice and did not help  Sumatriptan -stopped working   Ubrelvy - not covered     Rationale:  migraine     Insurance Name:  [employer]  Insurance ID:  [id number]     Clinic Information  Preferred routing pool for dept communication:    General Neurology      Zahraa Sorto CMA

## 2025-05-22 NOTE — TELEPHONE ENCOUNTER
Note: Due to record-high volumes, our turn-around time is taking longer than usual . We are currently 3  business days behind in the pools.   We are working diligently to submit all requests in a timely manner and in the order they are received. Please only flag TRUE URGENT requests as high priority to the pool at this time.   If you have questions on status of PA's,  please send a note/message in the active PA encounter and send back to the [facility] PA pool [[id number]].    If you have questions about the turn-around time or about our process, please reach out to our supervisor Deana Alejandro.   Thank you!   RPPA (Retail Pharmacy Prior Authorization) team    Retail Pharmacy Prior Authorization Team   Phone: [phone]    PA Initiation    Medication: NURTEC 75 MG PO TBDP  Insurance Company: [employer] - Phone [phone] Fax [phone]  Pharmacy Filling the Rx: [id number] - [place], [place] - [address]  [address]  Filling Pharmacy Phone: [phone]  Filling Pharmacy Fax:    Start Date: 5/22/2025    Epa states approved today (05/20/2025.)  Ran test claim, shows medication was ran on 05/20/2025 and the next fill date is 06/11/2025.

## 2025-06-15 ENCOUNTER — HEALTH MAINTENANCE LETTER (OUTPATIENT)
Age: 45
End: 2025-06-15

## 2025-07-18 DIAGNOSIS — M62.838 MUSCLE SPASM: ICD-10-CM

## 2025-07-18 NOTE — TELEPHONE ENCOUNTER
Neuroscience Clinic Task Note    TASK    Neurology Rx Refill  Medication tiZANidine (ZANAFLEX) 4 MG tablet    Dose Take 4 tablets (16 mg) by mouth at bedtime. - Oral    Last refill ordered (m/d/y) 09/17/2024   Last quantity ordered 360   Last # refills 1   Last clinic visit with ordering provider (m/d/y) 04/15/2025   Next clinic visit with ordering provider (m/d/y) None Scheduled   All pertinent protocol data (lab date/result)    Pertinent information from patient's message I have been doing really well.        FOLLOW-UP      ADDITIONAL COMMENTS  Pt requests a pharmacy change.     Padmini Nevarez